# Patient Record
Sex: MALE | Race: WHITE | NOT HISPANIC OR LATINO | URBAN - METROPOLITAN AREA
[De-identification: names, ages, dates, MRNs, and addresses within clinical notes are randomized per-mention and may not be internally consistent; named-entity substitution may affect disease eponyms.]

---

## 2017-04-20 ENCOUNTER — EMERGENCY (EMERGENCY)
Facility: HOSPITAL | Age: 67
LOS: 0 days | Discharge: NURSING, REHAB OR LONG-TERM CARE | End: 2017-04-21
Admitting: INTERNAL MEDICINE

## 2017-06-27 DIAGNOSIS — R45.1 RESTLESSNESS AND AGITATION: ICD-10-CM

## 2017-06-27 DIAGNOSIS — J44.9 CHRONIC OBSTRUCTIVE PULMONARY DISEASE, UNSPECIFIED: ICD-10-CM

## 2017-06-27 DIAGNOSIS — F17.200 NICOTINE DEPENDENCE, UNSPECIFIED, UNCOMPLICATED: ICD-10-CM

## 2017-06-27 DIAGNOSIS — Z79.82 LONG TERM (CURRENT) USE OF ASPIRIN: ICD-10-CM

## 2017-06-27 DIAGNOSIS — I10 ESSENTIAL (PRIMARY) HYPERTENSION: ICD-10-CM

## 2017-06-27 DIAGNOSIS — Z79.899 OTHER LONG TERM (CURRENT) DRUG THERAPY: ICD-10-CM

## 2017-06-27 DIAGNOSIS — K21.9 GASTRO-ESOPHAGEAL REFLUX DISEASE WITHOUT ESOPHAGITIS: ICD-10-CM

## 2017-12-21 ENCOUNTER — EMERGENCY (EMERGENCY)
Facility: HOSPITAL | Age: 67
LOS: 0 days | Discharge: NURSING, REHAB OR LONG-TERM CARE | End: 2017-12-21
Admitting: INTERNAL MEDICINE

## 2017-12-21 DIAGNOSIS — J18.9 PNEUMONIA, UNSPECIFIED ORGANISM: ICD-10-CM

## 2017-12-21 DIAGNOSIS — R45.1 RESTLESSNESS AND AGITATION: ICD-10-CM

## 2017-12-21 DIAGNOSIS — F42.4 EXCORIATION (SKIN-PICKING) DISORDER: ICD-10-CM

## 2017-12-21 DIAGNOSIS — F25.9 SCHIZOAFFECTIVE DISORDER, UNSPECIFIED: ICD-10-CM

## 2017-12-21 DIAGNOSIS — I10 ESSENTIAL (PRIMARY) HYPERTENSION: ICD-10-CM

## 2017-12-21 DIAGNOSIS — J45.909 UNSPECIFIED ASTHMA, UNCOMPLICATED: ICD-10-CM

## 2017-12-21 DIAGNOSIS — R50.9 FEVER, UNSPECIFIED: ICD-10-CM

## 2017-12-21 DIAGNOSIS — J44.9 CHRONIC OBSTRUCTIVE PULMONARY DISEASE, UNSPECIFIED: ICD-10-CM

## 2017-12-21 DIAGNOSIS — K21.9 GASTRO-ESOPHAGEAL REFLUX DISEASE WITHOUT ESOPHAGITIS: ICD-10-CM

## 2017-12-21 DIAGNOSIS — Z79.82 LONG TERM (CURRENT) USE OF ASPIRIN: ICD-10-CM

## 2017-12-21 DIAGNOSIS — Z91.09 OTHER ALLERGY STATUS, OTHER THAN TO DRUGS AND BIOLOGICAL SUBSTANCES: ICD-10-CM

## 2017-12-21 DIAGNOSIS — E66.9 OBESITY, UNSPECIFIED: ICD-10-CM

## 2017-12-21 DIAGNOSIS — Z87.891 PERSONAL HISTORY OF NICOTINE DEPENDENCE: ICD-10-CM

## 2017-12-21 DIAGNOSIS — E11.9 TYPE 2 DIABETES MELLITUS WITHOUT COMPLICATIONS: ICD-10-CM

## 2017-12-21 DIAGNOSIS — M10.9 GOUT, UNSPECIFIED: ICD-10-CM

## 2018-11-28 ENCOUNTER — EMERGENCY (EMERGENCY)
Facility: HOSPITAL | Age: 68
LOS: 0 days | Discharge: NURSING, REHAB OR LONG-TERM CARE | End: 2018-11-28
Attending: EMERGENCY MEDICINE | Admitting: EMERGENCY MEDICINE

## 2018-11-28 VITALS
OXYGEN SATURATION: 96 % | TEMPERATURE: 98 F | DIASTOLIC BLOOD PRESSURE: 78 MMHG | HEART RATE: 92 BPM | RESPIRATION RATE: 18 BRPM | SYSTOLIC BLOOD PRESSURE: 162 MMHG | WEIGHT: 229.94 LBS

## 2018-11-28 DIAGNOSIS — L02.415 CUTANEOUS ABSCESS OF RIGHT LOWER LIMB: ICD-10-CM

## 2018-11-28 DIAGNOSIS — Z79.82 LONG TERM (CURRENT) USE OF ASPIRIN: ICD-10-CM

## 2018-11-28 DIAGNOSIS — Z88.8 ALLERGY STATUS TO OTHER DRUGS, MEDICAMENTS AND BIOLOGICAL SUBSTANCES: ICD-10-CM

## 2018-11-28 DIAGNOSIS — I10 ESSENTIAL (PRIMARY) HYPERTENSION: ICD-10-CM

## 2018-11-28 DIAGNOSIS — J44.9 CHRONIC OBSTRUCTIVE PULMONARY DISEASE, UNSPECIFIED: ICD-10-CM

## 2018-11-28 DIAGNOSIS — K21.9 GASTRO-ESOPHAGEAL REFLUX DISEASE WITHOUT ESOPHAGITIS: ICD-10-CM

## 2018-11-28 DIAGNOSIS — F20.9 SCHIZOPHRENIA, UNSPECIFIED: ICD-10-CM

## 2018-11-28 DIAGNOSIS — Z98.890 OTHER SPECIFIED POSTPROCEDURAL STATES: ICD-10-CM

## 2018-11-28 DIAGNOSIS — Z79.899 OTHER LONG TERM (CURRENT) DRUG THERAPY: ICD-10-CM

## 2018-11-28 RX ORDER — ZIPRASIDONE HYDROCHLORIDE 20 MG/1
0 CAPSULE ORAL
Qty: 0 | Refills: 0 | COMMUNITY

## 2018-11-28 RX ORDER — AZTREONAM 2 G
1 VIAL (EA) INJECTION
Qty: 14 | Refills: 0
Start: 2018-11-28 | End: 2018-12-04

## 2018-11-28 RX ORDER — ZIPRASIDONE HYDROCHLORIDE 20 MG/1
1 CAPSULE ORAL
Qty: 0 | Refills: 0 | COMMUNITY

## 2018-11-28 RX ORDER — RANITIDINE HYDROCHLORIDE 150 MG/1
1 TABLET, FILM COATED ORAL
Qty: 0 | Refills: 0 | COMMUNITY

## 2018-11-28 RX ORDER — CEPHALEXIN 500 MG
1 CAPSULE ORAL
Qty: 28 | Refills: 0
Start: 2018-11-28 | End: 2018-12-04

## 2018-11-28 RX ORDER — AMLODIPINE BESYLATE 2.5 MG/1
1 TABLET ORAL
Qty: 0 | Refills: 0 | COMMUNITY

## 2018-11-28 RX ORDER — LINACLOTIDE 145 UG/1
1 CAPSULE, GELATIN COATED ORAL
Qty: 0 | Refills: 0 | COMMUNITY

## 2018-11-28 NOTE — ED PROVIDER NOTE - NS ED ROS FT
MS:  No myalgia, muscle weakness, joint pain or back pain.  Neuro:  No headache or weakness.  No LOC.  Skin:  +abscess  Endocrine: No history of thyroid disease or diabetes.  Except as documented in the HPI,  all other systems are negative.

## 2018-11-28 NOTE — ED ADULT NURSE NOTE - WOUND TYPE
RASH/erythematous lesion with hyperkeratotic center mid upper leg posterior left and miles-tibial lower leg, no drainage, depth unknown

## 2018-11-28 NOTE — ED PROVIDER NOTE - PHYSICAL EXAMINATION
VITAL SIGNS: I have reviewed nursing notes and confirm.  CONSTITUTIONAL: Well-developed; well-nourished; in no acute distress.   SKIN: 4x3cm abscess o right posterior thigh, with mild surrounding erythema.  EYES:  conjunctiva and sclera clear.  ENT: No nasal discharge; airway clear  CARD: S1, S2 normal; no murmurs, gallops, or rubs. Regular rate and rhythm.   RESP: No wheezes, rales or rhonchi.  ABD: Normal bowel sounds; soft; non-distended; non-tender  EXT: Normal ROM.  No clubbing, cyanosis or edema.   NEURO: Alert, oriented, grossly unremarkable

## 2018-11-28 NOTE — ED ADULT NURSE NOTE - PMH
COPD (chronic obstructive pulmonary disease)    GERD (gastroesophageal reflux disease)    Hypertension    Neuropathy    Schizophrenia    Substance abuse

## 2018-11-28 NOTE — ED ADULT NURSE NOTE - NSIMPLEMENTINTERV_GEN_ALL_ED
Implemented All Universal Safety Interventions:  [place] to call system. Call bell, personal items and telephone within reach. Instruct patient to call for assistance. Room bathroom lighting operational. Non-slip footwear when patient is off stretcher. Physically safe environment: no spills, clutter or unnecessary equipment. Stretcher in lowest position, wheels locked, appropriate side rails in place.

## 2018-11-28 NOTE — ED PROVIDER NOTE - ATTENDING CONTRIBUTION TO CARE
I personally evaluated the patient. I reviewed the Resident’s or Physician Assistant’s note (as assigned above), and agree with the findings and plan except as documented in my note.  I was present for and supervised the key/critical aspects of the procedures performed during the care of the patient.  A 69 y/o m w/ pmhx of copd, htn, gerd, schizophrenia, presents with L dorsal thigh abscess that he reports occurred over the past few days, tried to pop it, was still present so came to ed. pt reports his roommate has similar abscesses, pt has been treated with abx in the past for this, does not recall which ones, and states is following up with a physician. No fever, chills, n/v, cp,  pleuritic cp, sob, palpitations, diaphoresis, cough, ha/lh/dizziness, numbness/tingling, neck pain/ stiffness, abd pain, diarrhea, constipation, melena/brbpr, urinary symptoms, trauma, weakness, decreased sensation, current drug use, etoh abuse, edema, calf pain/swelling/erythema, sick contacts, or recent travel.  On Exam: Vital Signs: I have reviewed the initial vital signs. Constitutional: Male pt sitting on stretcher speaking full sentences. Integumentary: ~ 4x4 cm L posterior thigh abscess, with central area of scab from pt trying to squeeze it, surrounding fluctuance, mild erythema, no streaking, no crepitus, mild pain to palpation, no spontaneous purulent discharge, no odor.  ENT: MMM NECK: Supple, non-tender, no meningeal signs. Cardiovascular: RRR, radial pulses 2/4 b/l. No JVD. Respiratory: BS present b/l, ctabl, no wheezing or crackles, good resp effort and excursion, good air exchange,  no accessory muscle use, no stridor. Gastrointestinal: BS present throughout all 4 quadrants, soft, nd, nt, no rebound tenderness or guarding, no cvat. Musculoskeletal: FROM of ext including LLE, no weakness, no edema, no calf pain/swelling/erythema. Neurologic: AAOx3, motor 5/5 and sensation intact throughout upper and lower ext, CN II-XII intact, No facial droop or slurring of speech. No focal deficits. Ambulatory in ED.

## 2018-11-28 NOTE — ED PROVIDER NOTE - MEDICAL DECISION MAKING DETAILS
I and D done in ED, pt aware of abx, compliance, and wound check  either back in ed or w/ PMD in 48-72 hrs, with proper wound care discussed, pt aware and agrees would like to go home, reports will follow up.

## 2018-11-28 NOTE — ED PROVIDER NOTE - OBJECTIVE STATEMENT
Pt is a 67y/o male with a pmhx of schizophrenia, alcohol abuse, HTN, GERD presents today for eval of abscess to right posterior thigh for approx 1 week for which he has been applying hydrocortisone cream to. Pt denies fever, chills, weakness,, n/v/d.

## 2018-11-28 NOTE — ED PROVIDER NOTE - CARE PLAN
Assessment and plan of treatment:	Plan: L femur xray, I and D, reassess. Principal Discharge DX:	Abscess  Assessment and plan of treatment:	Plan: L femur xray, I and D, reassess.

## 2019-09-25 ENCOUNTER — INPATIENT (INPATIENT)
Facility: HOSPITAL | Age: 69
LOS: 4 days | Discharge: NURSING, REHAB OR LONG-TERM CARE | End: 2019-09-30
Attending: INTERNAL MEDICINE | Admitting: INTERNAL MEDICINE
Payer: MEDICARE

## 2019-09-25 VITALS
SYSTOLIC BLOOD PRESSURE: 208 MMHG | TEMPERATURE: 96 F | OXYGEN SATURATION: 98 % | RESPIRATION RATE: 19 BRPM | HEART RATE: 87 BPM | DIASTOLIC BLOOD PRESSURE: 97 MMHG

## 2019-09-25 DIAGNOSIS — R41.0 DISORIENTATION, UNSPECIFIED: ICD-10-CM

## 2019-09-25 DIAGNOSIS — R74.8 ABNORMAL LEVELS OF OTHER SERUM ENZYMES: ICD-10-CM

## 2019-09-25 PROBLEM — J44.9 CHRONIC OBSTRUCTIVE PULMONARY DISEASE, UNSPECIFIED: Chronic | Status: ACTIVE | Noted: 2018-11-28

## 2019-09-25 PROBLEM — K21.9 GASTRO-ESOPHAGEAL REFLUX DISEASE WITHOUT ESOPHAGITIS: Chronic | Status: ACTIVE | Noted: 2018-11-28

## 2019-09-25 PROBLEM — G62.9 POLYNEUROPATHY, UNSPECIFIED: Chronic | Status: ACTIVE | Noted: 2018-11-28

## 2019-09-25 PROBLEM — I10 ESSENTIAL (PRIMARY) HYPERTENSION: Chronic | Status: ACTIVE | Noted: 2018-11-28

## 2019-09-25 PROBLEM — F19.10 OTHER PSYCHOACTIVE SUBSTANCE ABUSE, UNCOMPLICATED: Chronic | Status: ACTIVE | Noted: 2018-11-28

## 2019-09-25 PROBLEM — F20.9 SCHIZOPHRENIA, UNSPECIFIED: Chronic | Status: ACTIVE | Noted: 2018-11-28

## 2019-09-25 LAB
ALBUMIN SERPL ELPH-MCNC: 4.6 G/DL — SIGNIFICANT CHANGE UP (ref 3.5–5.2)
ALP SERPL-CCNC: 59 U/L — SIGNIFICANT CHANGE UP (ref 30–115)
ALT FLD-CCNC: 35 U/L — SIGNIFICANT CHANGE UP (ref 0–41)
ANION GAP SERPL CALC-SCNC: 12 MMOL/L — SIGNIFICANT CHANGE UP (ref 7–14)
APAP SERPL-MCNC: <5 UG/ML — LOW (ref 10–30)
APPEARANCE UR: CLEAR — SIGNIFICANT CHANGE UP
AST SERPL-CCNC: 28 U/L — SIGNIFICANT CHANGE UP (ref 0–41)
BASOPHILS # BLD AUTO: 0.03 K/UL — SIGNIFICANT CHANGE UP (ref 0–0.2)
BASOPHILS NFR BLD AUTO: 0.5 % — SIGNIFICANT CHANGE UP (ref 0–1)
BILIRUB SERPL-MCNC: 0.5 MG/DL — SIGNIFICANT CHANGE UP (ref 0.2–1.2)
BILIRUB UR-MCNC: NEGATIVE — SIGNIFICANT CHANGE UP
BUN SERPL-MCNC: 21 MG/DL — HIGH (ref 10–20)
CALCIUM SERPL-MCNC: 9.8 MG/DL — SIGNIFICANT CHANGE UP (ref 8.5–10.1)
CHLORIDE SERPL-SCNC: 99 MMOL/L — SIGNIFICANT CHANGE UP (ref 98–110)
CO2 SERPL-SCNC: 30 MMOL/L — SIGNIFICANT CHANGE UP (ref 17–32)
COLOR SPEC: YELLOW — SIGNIFICANT CHANGE UP
CREAT SERPL-MCNC: 1.1 MG/DL — SIGNIFICANT CHANGE UP (ref 0.7–1.5)
DIFF PNL FLD: NEGATIVE — SIGNIFICANT CHANGE UP
DRUG SCREEN 1, URINE RESULT: SIGNIFICANT CHANGE UP
EOSINOPHIL # BLD AUTO: 0.1 K/UL — SIGNIFICANT CHANGE UP (ref 0–0.7)
EOSINOPHIL NFR BLD AUTO: 1.6 % — SIGNIFICANT CHANGE UP (ref 0–8)
ETHANOL SERPL-MCNC: <10 MG/DL — SIGNIFICANT CHANGE UP
GLUCOSE SERPL-MCNC: 120 MG/DL — HIGH (ref 70–99)
GLUCOSE UR QL: NEGATIVE — SIGNIFICANT CHANGE UP
HCT VFR BLD CALC: 41.8 % — LOW (ref 42–52)
HGB BLD-MCNC: 13.2 G/DL — LOW (ref 14–18)
IMM GRANULOCYTES NFR BLD AUTO: 0.2 % — SIGNIFICANT CHANGE UP (ref 0.1–0.3)
KETONES UR-MCNC: NEGATIVE — SIGNIFICANT CHANGE UP
LEUKOCYTE ESTERASE UR-ACNC: NEGATIVE — SIGNIFICANT CHANGE UP
LYMPHOCYTES # BLD AUTO: 1.32 K/UL — SIGNIFICANT CHANGE UP (ref 1.2–3.4)
LYMPHOCYTES # BLD AUTO: 21.3 % — SIGNIFICANT CHANGE UP (ref 20.5–51.1)
MCHC RBC-ENTMCNC: 28.2 PG — SIGNIFICANT CHANGE UP (ref 27–31)
MCHC RBC-ENTMCNC: 31.6 G/DL — LOW (ref 32–37)
MCV RBC AUTO: 89.3 FL — SIGNIFICANT CHANGE UP (ref 80–94)
MONOCYTES # BLD AUTO: 0.36 K/UL — SIGNIFICANT CHANGE UP (ref 0.1–0.6)
MONOCYTES NFR BLD AUTO: 5.8 % — SIGNIFICANT CHANGE UP (ref 1.7–9.3)
NEUTROPHILS # BLD AUTO: 4.37 K/UL — SIGNIFICANT CHANGE UP (ref 1.4–6.5)
NEUTROPHILS NFR BLD AUTO: 70.6 % — SIGNIFICANT CHANGE UP (ref 42.2–75.2)
NITRITE UR-MCNC: NEGATIVE — SIGNIFICANT CHANGE UP
NRBC # BLD: 0 /100 WBCS — SIGNIFICANT CHANGE UP (ref 0–0)
PH UR: 7 — SIGNIFICANT CHANGE UP (ref 5–8)
PLATELET # BLD AUTO: 151 K/UL — SIGNIFICANT CHANGE UP (ref 130–400)
POTASSIUM SERPL-MCNC: 4.2 MMOL/L — SIGNIFICANT CHANGE UP (ref 3.5–5)
POTASSIUM SERPL-SCNC: 4.2 MMOL/L — SIGNIFICANT CHANGE UP (ref 3.5–5)
PROT SERPL-MCNC: 8.1 G/DL — HIGH (ref 6–8)
PROT UR-MCNC: NEGATIVE — SIGNIFICANT CHANGE UP
RBC # BLD: 4.68 M/UL — LOW (ref 4.7–6.1)
RBC # FLD: 16 % — HIGH (ref 11.5–14.5)
SALICYLATES SERPL-MCNC: <0.3 MG/DL — LOW (ref 4–30)
SODIUM SERPL-SCNC: 141 MMOL/L — SIGNIFICANT CHANGE UP (ref 135–146)
SP GR SPEC: 1.01 — SIGNIFICANT CHANGE UP (ref 1.01–1.03)
TROPONIN T SERPL-MCNC: 0.62 NG/ML — CRITICAL HIGH
TROPONIN T SERPL-MCNC: 0.62 NG/ML — CRITICAL HIGH
UROBILINOGEN FLD QL: 0.2 — SIGNIFICANT CHANGE UP (ref 0.2–0.2)
WBC # BLD: 6.19 K/UL — SIGNIFICANT CHANGE UP (ref 4.8–10.8)
WBC # FLD AUTO: 6.19 K/UL — SIGNIFICANT CHANGE UP (ref 4.8–10.8)

## 2019-09-25 PROCEDURE — 71046 X-RAY EXAM CHEST 2 VIEWS: CPT | Mod: 26

## 2019-09-25 PROCEDURE — 90792 PSYCH DIAG EVAL W/MED SRVCS: CPT

## 2019-09-25 PROCEDURE — 99285 EMERGENCY DEPT VISIT HI MDM: CPT

## 2019-09-25 PROCEDURE — 70450 CT HEAD/BRAIN W/O DYE: CPT | Mod: 26

## 2019-09-25 RX ORDER — SODIUM CHLORIDE 9 MG/ML
1000 INJECTION, SOLUTION INTRAVENOUS
Refills: 0 | Status: DISCONTINUED | OUTPATIENT
Start: 2019-09-25 | End: 2019-09-30

## 2019-09-25 RX ORDER — FLUPHENAZINE HYDROCHLORIDE 1 MG/1
0 TABLET, FILM COATED ORAL
Qty: 0 | Refills: 0 | DISCHARGE

## 2019-09-25 RX ORDER — ACETAMINOPHEN 500 MG
650 TABLET ORAL EVERY 6 HOURS
Refills: 0 | Status: DISCONTINUED | OUTPATIENT
Start: 2019-09-25 | End: 2019-09-30

## 2019-09-25 RX ORDER — HEPARIN SODIUM 5000 [USP'U]/ML
5000 INJECTION INTRAVENOUS; SUBCUTANEOUS EVERY 8 HOURS
Refills: 0 | Status: DISCONTINUED | OUTPATIENT
Start: 2019-09-25 | End: 2019-09-30

## 2019-09-25 RX ORDER — AMLODIPINE BESYLATE 2.5 MG/1
5 TABLET ORAL DAILY
Refills: 0 | Status: DISCONTINUED | OUTPATIENT
Start: 2019-09-25 | End: 2019-09-30

## 2019-09-25 RX ORDER — ASPIRIN/CALCIUM CARB/MAGNESIUM 324 MG
81 TABLET ORAL DAILY
Refills: 0 | Status: DISCONTINUED | OUTPATIENT
Start: 2019-09-25 | End: 2019-09-30

## 2019-09-25 RX ORDER — CLOPIDOGREL BISULFATE 75 MG/1
300 TABLET, FILM COATED ORAL ONCE
Refills: 0 | Status: COMPLETED | OUTPATIENT
Start: 2019-09-25 | End: 2019-09-26

## 2019-09-25 RX ORDER — DOCUSATE SODIUM 100 MG
1 CAPSULE ORAL
Qty: 0 | Refills: 0 | DISCHARGE

## 2019-09-25 RX ORDER — DIPHENHYDRAMINE HCL 50 MG
1 CAPSULE ORAL
Qty: 0 | Refills: 0 | DISCHARGE

## 2019-09-25 RX ORDER — METOPROLOL TARTRATE 50 MG
25 TABLET ORAL DAILY
Refills: 0 | Status: DISCONTINUED | OUTPATIENT
Start: 2019-09-25 | End: 2019-09-30

## 2019-09-25 RX ORDER — ACETAMINOPHEN 500 MG
2 TABLET ORAL
Qty: 0 | Refills: 0 | DISCHARGE

## 2019-09-25 RX ADMIN — Medication 2 MILLIGRAM(S): at 20:45

## 2019-09-25 NOTE — H&P ADULT - HISTORY OF PRESENT ILLNESS
Pt is a 69 yo m resident of [facility] with h/o schizophrenia HTN COPD sent from facility for psychosis. As per report pt was urinating in the hallway and had AMS. In ER pt without complaints and did not know why he was sent, he denies fever, chills, CP, SOB, N/V/D. He became agitated before assessment and was given Ativan full ROS limited at this time.   Pt is known by psych and is usually AAOx3 despite h/o schizophrenia , today was AAOx1. He underwent NCHCT, urine studies and blood work to r/o medical causes of AMS and was found to have elevated troponin 0.06 x2. Pt is a 69 yo m resident of [facility] with h/o schizophrenia HTN COPD sent from facility for psychosis. As per report pt was urinating in the hallway and had AMS. In ER pt without complaints and did not know why he was sent, he denies fever, chills, CP, SOB, N/V/D. He became agitated before assessment and was given Ativan full ROS limited at this time.   Pt is known by psych and is usually AAOx3 despite h/o schizophrenia , today was AAOx1. He underwent NCHCT, urine studies and blood work to r/o medical causes of AMS and was found to have elevated troponin 0.6 x2.

## 2019-09-25 NOTE — H&P ADULT - NSHPLABSRESULTS_GEN_ALL_CORE
13.2   6.19  )-----------( 151      ( 25 Sep 2019 17:50 )             41.8     Urinalysis Basic - ( 25 Sep 2019 17:50 )    Color: Yellow / Appearance: Clear / S.015 / pH: x  Gluc: x / Ketone: Negative  / Bili: Negative / Urobili: 0.2   Blood: x / Protein: Negative / Nitrite: Negative   Leuk Esterase: Negative / RBC: x / WBC x   Sq Epi: x / Non Sq Epi: x / Bacteria: x          141  |  99  |  21<H>  ----------------------------<  120<H>  4.2   |  30  |  1.1    Ca    9.8      25 Sep 2019 17:50    TPro  8.1<H>  /  Alb  4.6  /  TBili  0.5  /  DBili  x   /  AST  28  /  ALT  35  /  AlkPhos  59      CARDIAC MARKERS ( 25 Sep 2019 20:55 )  x     / 0.62 ng/mL / x     / x     / x      CARDIAC MARKERS ( 25 Sep 2019 17:50 )  x     / 0.62 ng/mL / x     / x     / x        < from: CT Head No Cont (19 @ 17:43) >      Findings:    The ventricles and cortical sulci are prominent consistent with   parenchymal volume loss.    There are scattered punctate hypodensities throughout the hemispheric   white matter without mass effect compatible with chronic microvascular   changes.      There is no acute intracranial hemorrhage, extra-axial fluid collection   or midline shift.  Gray white matter differentiation is maintained.    Stable small round hypodensity in the right posterior basal ganglia seen   on prior examination, likely representing old lacunar infarct versus   prominent [place] perivascular space.    There is calcific atherosclerotic disease at the skullbase.    The calvarium is intact.    The visualized paranasal sinuses and mastoids are well aerated.      IMPRESSION:     No evidence of acute intracranial pathology.    < end of copied text >

## 2019-09-25 NOTE — ED PROVIDER NOTE - OBJECTIVE STATEMENT
hx from   69 yo M hx of schizophrenia, , neuropathy hx from Dr. Lindsay from facility   69 yo M hx of schizophrenia, COPD, neuropathy here for altered mental status. Patient is acting differently today and was urinating in the stairwells.

## 2019-09-25 NOTE — ED BEHAVIORAL HEALTH NOTE - BEHAVIORAL HEALTH NOTE
CC: "I was sent here for evaluation, I do not know why". Reportedly, Pt urinated at residence in hallway 3-4 times today.    HPI: 69yo WM with h/o schizophrenia, [facility] resident, BIBA from [facility] due to acute change in mental status. Pt spent longer than 2 hours in ED prior to this eval, and has been quiet, not agitated, resting comfortably in bed with 1:1  nearby. Pt was seen, EMR reviewed, collaterals obtained from [facility]. Pt is hard to interview due to prominent dysarthria and disorganised t/p. He denies any psychiatric complaints, denies depression or SI/HI. He is delusional (which is his baseline), stating that babies are raped at his residence and he has connection with different government organizations. When asked why he urinated in hallway at residence, he said "there was shit and piss everywhere, so what could I do?". Of note, he reports today's date as 12/25/2027, not oriented to place, and states that his age is "above 71".   Collaterals were obtained from psychologist Dr. Lindsay at [facility] (phone [phone]). Pt is delusional and psychotic on baseline. However, he is usually oriented x3 and can maintain a meaningful conversation. He was functioning on his baseline till today, when he became disoriented and urinated several times in hallways. This is an acute mental status change. Pt has been adherent to his medications.    ED course: On 1:1, calm, cooperative, not agitated.    PMH: COPD, GERD, HTN, neuropathy. BP at /97.     PPH: schizophrenia, followed at residence.    Current meds: Prolixin 2.5mg/ml soln, prolixin PO 10mg q12h.    FH: unable to obtain    Drug Hx: ETOH use indicated in the EMR.    SH: [facility] resident    MSE: A,Ox1 approximately (to his name only), no PMA/R, speech slurred, hard to understand, mood "varies", affect odd, blunted, t/p tangential, t/c + delusions of "babies being raped at his residence" and connection to different agencies, likely AH, no VH, denies SI/HI, i/j poor.    A/P 69yo M with schizophrenia, acute mental status change over last 24 hours or less, no si/hi, baseline delusions. Pt needs to be assessed medically to r/o delirium due to [facility]. No indications for iPP admission.

## 2019-09-25 NOTE — ED PROVIDER NOTE - PHYSICAL EXAMINATION
Gen: Alert, NAD, well appearing  Head: NC, AT, PERRL, EOMI, normal lids/conjunctiva  ENT: normal hearing, patent oropharynx   Neck: +supple, no tenderness/meningismus,  Pulm: Bilateral BS, normal resp effort, no wheeze/stridor/retractions  CV: RRR, no murmer  Abd: soft, NT/ND  Mskel: no edema/erythema/cyanosis  Skin: no rash, warm/dry  Neuro: AAOx1, no focal deficits noted, rambling on and on

## 2019-09-25 NOTE — ED ADULT NURSE NOTE - CHIEF COMPLAINT QUOTE
biba via PCA from [facility], staff reported to ems dramatic mood swings, anger.

## 2019-09-25 NOTE — H&P ADULT - NSHPPHYSICALEXAM_GEN_ALL_CORE
ICU Vital Signs Last 24 Hrs  T(C): 36.3 (25 Sep 2019 21:27), Max: 36.3 (25 Sep 2019 21:27)  T(F): 97.3 (25 Sep 2019 21:27), Max: 97.3 (25 Sep 2019 21:27)  HR: 94 (25 Sep 2019 21:27) (85 - 94)  BP: 154/72 (25 Sep 2019 21:27) (154/72 - 208/97)  RR: 19 (25 Sep 2019 21:27) (19 - 19)  SpO2: 98% (25 Sep 2019 21:27) (98% - 98%)    Constitutional: NAD,  non toxic appearing   HEENT: Airway patent, moist MM, no erythema/swelling/deformity of oral structures. EOMI, PERRLA.  CV: regular rate, regular rhythm, well-perfused extremities, 2+ b/l DP and radial pulses equal.  Lungs: BCTA, no increased WOB.  ABD: NTND, no guarding or rebound, no pulsatile mass, no hernias.   MSK: Neck supple, nontender, nl ROM, no stepoff. Chest nontender. Back nontender in TLS spine or to b/l bony structures or flanks. Ext nontender, nl rom, no deformity.   INTEG: Skin warm, dry, no rash.  NEURO: A&Ox1   PSYCH: Denies SI/HI/hallucinations ICU Vital Signs Last 24 Hrs  T(C): 36.3 (25 Sep 2019 21:27), Max: 36.3 (25 Sep 2019 21:27)  T(F): 97.3 (25 Sep 2019 21:27), Max: 97.3 (25 Sep 2019 21:27)  HR: 94 (25 Sep 2019 21:27) (85 - 94)  BP: 154/72 (25 Sep 2019 21:27) (154/72 - 208/97)  RR: 19 (25 Sep 2019 21:27) (19 - 19)  SpO2: 98% (25 Sep 2019 21:27) (98% - 98%)    Constitutional: NAD,  non toxic appearing   HEENT: Airway patent, moist MM, no erythema/swelling/deformity of oral structures. EOMI, PERRLA.  CV: regular rate, regular rhythm, well-perfused extremities, 2+ b/l DP and radial pulses equal.  Lungs: BCTA, no increased WOB.  ABD: NTND, no guarding or rebound, no pulsatile mass, no hernias.   MSK: Neck supple, nontender, nl ROM, no stepoff. Chest nontender. Back nontender in TLS spine or to b/l bony structures or flanks. Ext nontender, nl rom, no deformity.   INTEG: Skin warm, dry, no rash.  NEURO: A&Ox0 s/p ativan   PSYCH: Denies SI/HI/hallucinations

## 2019-09-25 NOTE — ED ADULT NURSE REASSESSMENT NOTE - NS ED NURSE REASSESS COMMENT FT1
Attempted to redraw patients tropinin levels and found the patient to be agigtated. Patient refused to have blood drawn. BRIAN Aranda made aware.

## 2019-09-25 NOTE — ED PROVIDER NOTE - ATTENDING CONTRIBUTION TO CARE
69 yo M PMHx noted including schizophrenia, COPD, HTN, presents from residence for evaluation of change in behavior. Pt agitated, was urinating in halls at residence.  In ED pt is alert and awake, Oriented to person, seen eating a meal tray. calm until approached, then is loud. no sign sof trauma, PERRL, Lungs CTA B/L no wrr, abd is soft nt nd, no edema, moving all ext

## 2019-09-25 NOTE — CONSULT NOTE ADULT - SUBJECTIVE AND OBJECTIVE BOX
Patient is a 68y old  Male who presents with a chief complaint of sent from [facility] for AMS (25 Sep 2019 22:34)      HPI:  Pt is a 67 yo m resident of [facility] with h/o schizophrenia HTN COPD sent from facility for psychosis. As per report pt was urinating in the hallway and had AMS. In ER pt without complaints and did not know why he was sent, he denies fever, chills, CP, SOB, N/V/D. He became agitated before assessment and was given Ativan full ROS limited at this time.   Pt is known by psych and is usually AAOx3 despite h/o schizophrenia , today was AAOx1. He underwent NCHCT, urine studies and blood work to r/o medical causes of AMS and was found to have elevated troponin 0.06 x2. (25 Sep 2019 22:34)  Pt ER labs show elevated troponins. x 2     PAST MEDICAL & SURGICAL HISTORY:  Hypertension  Neuropathy  Substance abuse  Schizophrenia  COPD (chronic obstructive pulmonary disease)  GERD (gastroesophageal reflux disease)  No significant past surgical history    Allergies    Talwin (Unknown)    Intolerances      FAMILY HISTORY:  No pertinent family history in first degree relatives    Home Medications:  Artificial Tears ophthalmic solution:  (2018 13:17)  Aspir 81 oral delayed release tablet: 1 tab(s) orally once a day (2018 13:17)  Benadryl 50 mg oral capsule: 1 cap(s) orally once a day (25 Sep 2019 22:27)  Claritin 10 mg oral tablet: 1 tab(s) orally once a day (2018 13:17)  Colace 100 mg oral capsule: 2 cap(s) orally once a day (at bedtime) (25 Sep 2019 22:28)  Geodon 80 mg oral capsule: 1  orally 2 times a day (2018 13:39)  Linzess 145 mcg oral capsule: 1 cap(s) orally once a day (2018 13:40)  Norvasc 5 mg oral tablet: 1 tab(s) orally once a day (2018 13:17)  Prolixin 10 mg oral tablet: 1 tab(s) orally every 12 hours (2018 13:17)  Prolixin Decanoate 25 mg/mL injectable solution: 25 milligram(s) injectable every 2 weeks (25 Sep 2019 22:34)  Tylenol 325 mg oral tablet: 2 tab(s) orally every 8 hours, As Needed (25 Sep 2019 22:36)  Vitamin D3 50,000 intl units oral capsule:  (2018 13:17)  Zantac 150 oral tablet: 1 tab(s) orally 2 times a day (2018 13:17)    Occupation:  Alochol: Denied  Smoking: Denied  Drug Use: Denied  Marital Status:         ROS: as in HPI; All other systems reviewed are negative    ICU Vital Signs Last 24 Hrs  T(C): 36.3 (25 Sep 2019 21:27), Max: 36.3 (25 Sep 2019 21:27)  T(F): 97.3 (25 Sep 2019 21:27), Max: 97.3 (25 Sep 2019 21:27)  HR: 94 (25 Sep 2019 21:27) (85 - 94)  BP: 154/72 (25 Sep 2019 21:27) (154/72 - 208/97)  BP(mean): --  ABP: --  ABP(mean): --  RR: 19 (25 Sep 2019 21:27) (19 - 19)  SpO2: 98% (25 Sep 2019 21:27) (98% - 98%)        Physical Examination:    General: middle aged white male  No acute respiratory distress.  pt's sedated in ER     HEENT: Pupils equal, reactive to light.  Symmetric.    PULM: Clear to auscultation bilaterally, no significant sputum production    CVS: Regular rate and rhythm, no murmurs, rubs, or gallops    ABD: Soft, nondistended, nontender, normoactive bowel sounds, no masses    EXT: No edema, nontender    SKIN: Warm and well perfused, no rashes noted.              I&O's Detail        LABS:                        13.2   6.19  )-----------( 151      ( 25 Sep 2019 17:50 )             41.8     25 Sep 2019 17:50    141    |  99     |  21     ----------------------------<  120    4.2     |  30     |  1.1      Ca    9.8        25 Sep 2019 17:50    TPro  8.1    /  Alb  4.6    /  TBili  0.5    /  DBili  x      /  AST  28     /  ALT  35     /  AlkPhos  59     25 Sep 2019 17:50  Amylase x     lipase x          CARDIAC MARKERS ( 25 Sep 2019 20:55 )  x     / 0.62 ng/mL / x     / x     / x      CARDIAC MARKERS ( 25 Sep 2019 17:50 )  x     / 0.62 ng/mL / x     / x     / x          CAPILLARY BLOOD GLUCOSE          Urinalysis Basic - ( 25 Sep 2019 17:50 )    Color: Yellow / Appearance: Clear / S.015 / pH: x  Gluc: x / Ketone: Negative  / Bili: Negative / Urobili: 0.2   Blood: x / Protein: Negative / Nitrite: Negative   Leuk Esterase: Negative / RBC: x / WBC x   Sq Epi: x / Non Sq Epi: x / Bacteria: x      Culture        MEDICATIONS  (STANDING):  amLODIPine   Tablet 5 milliGRAM(s) Oral daily  aspirin enteric coated 81 milliGRAM(s) Oral daily    MEDICATIONS  (PRN):  acetaminophen   Tablet .. 650 milliGRAM(s) Oral every 6 hours PRN Temp greater or equal to 38C (100.4F), Mild Pain (1 - 3)              IMPRESSION:  1. acute altered mental status. w/ elevated troponins r/o-NSTEMI      PLAN:  pt 's in no acute distress . discussed w/ on call ICU MD . No need for ICU @ this time  adm. to LR tele.    CNS:    HEENT:    PULMONARY:    CARDIOVASCULAR:  trend troponins start ASA, plavix, BB , cardio eval in am     GI: GI prophylaxis.  Feeding     RENAL:    INFECTIOUS DISEASE:    HEMATOLOGICAL:  DVT prophylaxis.    ENDOCRINE:  Follow up FS.  Insulin protocol if needed.    MUSCULOSKELETAL:        CRITICAL CARE TIME SPENT: *** Patient is a 68y old  Male who presents with a chief complaint of sent from [facility] for AMS (25 Sep 2019 22:34)      HPI:  Pt is a 69 yo m resident of [facility] with h/o schizophrenia HTN COPD sent from facility for psychosis. As per report pt was urinating in the hallway and had AMS. In ER pt without complaints and did not know why he was sent, he denies fever, chills, CP, SOB, N/V/D. He became agitated before assessment and was given Ativan full ROS limited at this time.   Pt is known by psych and is usually AAOx3 despite h/o schizophrenia , today was AAOx1. He underwent NCHCT, urine studies and blood work to r/o medical causes of AMS and was found to have elevated troponin 0.62  x2. (25 Sep 2019 22:34)  Pt ER labs show elevated troponins. x 2     PAST MEDICAL & SURGICAL HISTORY:  Hypertension  Neuropathy  Substance abuse  Schizophrenia  COPD (chronic obstructive pulmonary disease)  GERD (gastroesophageal reflux disease)  No significant past surgical history    Allergies    Talwin (Unknown)    Intolerances      FAMILY HISTORY:  No pertinent family history in first degree relatives    Home Medications:  Artificial Tears ophthalmic solution:  (2018 13:17)  Aspir 81 oral delayed release tablet: 1 tab(s) orally once a day (2018 13:17)  Benadryl 50 mg oral capsule: 1 cap(s) orally once a day (25 Sep 2019 22:27)  Claritin 10 mg oral tablet: 1 tab(s) orally once a day (2018 13:17)  Colace 100 mg oral capsule: 2 cap(s) orally once a day (at bedtime) (25 Sep 2019 22:28)  Geodon 80 mg oral capsule: 1  orally 2 times a day (2018 13:39)  Linzess 145 mcg oral capsule: 1 cap(s) orally once a day (2018 13:40)  Norvasc 5 mg oral tablet: 1 tab(s) orally once a day (2018 13:17)  Prolixin 10 mg oral tablet: 1 tab(s) orally every 12 hours (2018 13:17)  Prolixin Decanoate 25 mg/mL injectable solution: 25 milligram(s) injectable every 2 weeks (25 Sep 2019 22:34)  Tylenol 325 mg oral tablet: 2 tab(s) orally every 8 hours, As Needed (25 Sep 2019 22:36)  Vitamin D3 50,000 intl units oral capsule:  (2018 13:17)  Zantac 150 oral tablet: 1 tab(s) orally 2 times a day (2018 13:17)    Occupation:  Alochol: Denied  Smoking: Denied  Drug Use: Denied  Marital Status:         ROS: as in HPI; All other systems reviewed are negative    ICU Vital Signs Last 24 Hrs  T(C): 36.3 (25 Sep 2019 21:27), Max: 36.3 (25 Sep 2019 21:27)  T(F): 97.3 (25 Sep 2019 21:27), Max: 97.3 (25 Sep 2019 21:27)  HR: 94 (25 Sep 2019 21:27) (85 - 94)  BP: 154/72 (25 Sep 2019 21:27) (154/72 - 208/97)  BP(mean): --  ABP: --  ABP(mean): --  RR: 19 (25 Sep 2019 21:27) (19 - 19)  SpO2: 98% (25 Sep 2019 21:27) (98% - 98%)        Physical Examination:    General: middle aged white male  No acute respiratory distress.  pt's sedated in ER     HEENT: Pupils equal, reactive to light.  Symmetric.    PULM: Clear to auscultation bilaterally, no significant sputum production    CVS: Regular rate and rhythm, no murmurs, rubs, or gallops    ABD: Soft, nondistended, nontender, normoactive bowel sounds, no masses    EXT: No edema, nontender    SKIN: Warm and well perfused, no rashes noted.              I&O's Detail        LABS:                        13.2   6.19  )-----------( 151      ( 25 Sep 2019 17:50 )             41.8     25 Sep 2019 17:50    141    |  99     |  21     ----------------------------<  120    4.2     |  30     |  1.1      Ca    9.8        25 Sep 2019 17:50    TPro  8.1    /  Alb  4.6    /  TBili  0.5    /  DBili  x      /  AST  28     /  ALT  35     /  AlkPhos  59     25 Sep 2019 17:50  Amylase x     lipase x          CARDIAC MARKERS ( 25 Sep 2019 20:55 )  x     / 0.62 ng/mL / x     / x     / x      CARDIAC MARKERS ( 25 Sep 2019 17:50 )  x     / 0.62 ng/mL / x     / x     / x          CAPILLARY BLOOD GLUCOSE          Urinalysis Basic - ( 25 Sep 2019 17:50 )    Color: Yellow / Appearance: Clear / S.015 / pH: x  Gluc: x / Ketone: Negative  / Bili: Negative / Urobili: 0.2   Blood: x / Protein: Negative / Nitrite: Negative   Leuk Esterase: Negative / RBC: x / WBC x   Sq Epi: x / Non Sq Epi: x / Bacteria: x      Culture        MEDICATIONS  (STANDING):  amLODIPine   Tablet 5 milliGRAM(s) Oral daily  aspirin enteric coated 81 milliGRAM(s) Oral daily    MEDICATIONS  (PRN):  acetaminophen   Tablet .. 650 milliGRAM(s) Oral every 6 hours PRN Temp greater or equal to 38C (100.4F), Mild Pain (1 - 3)              IMPRESSION:  1. acute altered mental status. w/ elevated troponins r/o-NSTEMI      PLAN:  pt 's in no acute distress . discussed w/ on call ICU MD . No need for ICU @ this time  adm. to LR tele.    CNS:    HEENT:    PULMONARY:    CARDIOVASCULAR:  trend troponins start ASA, plavix, BB , cardio eval in am     GI: GI prophylaxis.  Feeding     RENAL:    INFECTIOUS DISEASE:    HEMATOLOGICAL:  DVT prophylaxis.    ENDOCRINE:  Follow up FS.  Insulin protocol if needed.    MUSCULOSKELETAL:        CRITICAL CARE TIME SPENT: *** Patient is a 68y old  Male who presents with a chief complaint of sent from [facility] for AMS (25 Sep 2019 22:34)      HPI:  Pt is a 69 yo m resident of [facility] with h/o schizophrenia HTN COPD sent from facility for psychosis. As per report pt was urinating in the hallway and had AMS. In ER pt without complaints and did not know why he was sent, he denies fever, chills, CP, SOB, N/V/D. He became agitated before assessment and was given Ativan full ROS limited at this time.   Pt is known by psych and is usually AAOx3 despite h/o schizophrenia , today was AAOx1. He underwent NCHCT, urine studies and blood work to r/o medical causes of AMS and was found to have elevated troponin 0.62  x2. (25 Sep 2019 22:34)  Pt ER labs show elevated troponins. x 2     PAST MEDICAL & SURGICAL HISTORY:  Hypertension  Neuropathy  Substance abuse  Schizophrenia  COPD (chronic obstructive pulmonary disease)  GERD (gastroesophageal reflux disease)  No significant past surgical history    Allergies    Talwin (Unknown)    Intolerances      FAMILY HISTORY:  No pertinent family history in first degree relatives    Home Medications:  Artificial Tears ophthalmic solution:  (2018 13:17)  Aspir 81 oral delayed release tablet: 1 tab(s) orally once a day (2018 13:17)  Benadryl 50 mg oral capsule: 1 cap(s) orally once a day (25 Sep 2019 22:27)  Claritin 10 mg oral tablet: 1 tab(s) orally once a day (2018 13:17)  Colace 100 mg oral capsule: 2 cap(s) orally once a day (at bedtime) (25 Sep 2019 22:28)  Geodon 80 mg oral capsule: 1  orally 2 times a day (2018 13:39)  Linzess 145 mcg oral capsule: 1 cap(s) orally once a day (2018 13:40)  Norvasc 5 mg oral tablet: 1 tab(s) orally once a day (2018 13:17)  Prolixin 10 mg oral tablet: 1 tab(s) orally every 12 hours (2018 13:17)  Prolixin Decanoate 25 mg/mL injectable solution: 25 milligram(s) injectable every 2 weeks (25 Sep 2019 22:34)  Tylenol 325 mg oral tablet: 2 tab(s) orally every 8 hours, As Needed (25 Sep 2019 22:36)  Vitamin D3 50,000 intl units oral capsule:  (2018 13:17)  Zantac 150 oral tablet: 1 tab(s) orally 2 times a day (2018 13:17)    Occupation:  Alochol: Denied  Smoking: Denied  Drug Use: Denied  Marital Status:         ROS: as in HPI; All other systems reviewed are negative    ICU Vital Signs Last 24 Hrs  T(C): 36.3 (25 Sep 2019 21:27), Max: 36.3 (25 Sep 2019 21:27)  T(F): 97.3 (25 Sep 2019 21:27), Max: 97.3 (25 Sep 2019 21:27)  HR: 94 (25 Sep 2019 21:27) (85 - 94)  BP: 154/72 (25 Sep 2019 21:27) (154/72 - 208/97)  BP(mean): --  ABP: --  ABP(mean): --  RR: 19 (25 Sep 2019 21:27) (19 - 19)  SpO2: 98% (25 Sep 2019 21:27) (98% - 98%)        Physical Examination:    General: middle aged white male  No acute respiratory distress.      HEENT: Pupils equal, reactive to light.  Symmetric.    PULM: Clear to auscultation bilaterally, no significant sputum production    CVS: Regular rate and rhythm, no murmurs, rubs, or gallops    ABD: Soft, nondistended, nontender, normoactive bowel sounds, no masses    EXT: No edema, nontender    SKIN: Warm and well perfused, no rashes noted.              I&O's Detail        LABS:                        13.2   6.19  )-----------( 151      ( 25 Sep 2019 17:50 )             41.8     25 Sep 2019 17:50    141    |  99     |  21     ----------------------------<  120    4.2     |  30     |  1.1      Ca    9.8        25 Sep 2019 17:50    TPro  8.1    /  Alb  4.6    /  TBili  0.5    /  DBili  x      /  AST  28     /  ALT  35     /  AlkPhos  59     25 Sep 2019 17:50  Amylase x     lipase x          CARDIAC MARKERS ( 25 Sep 2019 20:55 )  x     / 0.62 ng/mL / x     / x     / x      CARDIAC MARKERS ( 25 Sep 2019 17:50 )  x     / 0.62 ng/mL / x     / x     / x          CAPILLARY BLOOD GLUCOSE          Urinalysis Basic - ( 25 Sep 2019 17:50 )    Color: Yellow / Appearance: Clear / S.015 / pH: x  Gluc: x / Ketone: Negative  / Bili: Negative / Urobili: 0.2   Blood: x / Protein: Negative / Nitrite: Negative   Leuk Esterase: Negative / RBC: x / WBC x   Sq Epi: x / Non Sq Epi: x / Bacteria: x      Culture        MEDICATIONS  (STANDING):  amLODIPine   Tablet 5 milliGRAM(s) Oral daily  aspirin enteric coated 81 milliGRAM(s) Oral daily    MEDICATIONS  (PRN):  acetaminophen   Tablet .. 650 milliGRAM(s) Oral every 6 hours PRN Temp greater or equal to 38C (100.4F), Mild Pain (1 - 3)

## 2019-09-25 NOTE — ED PROVIDER NOTE - CLINICAL SUMMARY MEDICAL DECISION MAKING FREE TEXT BOX
Pt seen by Dr Osuna, rec medical work up. Medical work up reveals elevated troponin.  Pt with increased agitation in ED.,  Required Ativan for his and staff safety. 2nd troponin unchanged.  Case d/w Intensivist, will admit to chest pain tele

## 2019-09-25 NOTE — H&P ADULT - ASSESSMENT
67 yo m resident of [facility] with h/o schizophrenia HTN COPD p/w elevated troponin without complaints of chest pain     # r/o ACS  admit to low risk tele  trend cardiac enzymes   serial EKG   echo   check electrolytes     # pmhx schizophrenia  appreciate psych recs   will need f/u for collateral pmhx     dvt ppx   gi ppx

## 2019-09-25 NOTE — H&P ADULT - ATTENDING COMMENTS
pt seen and examined independently,i have read and agree with above exam and poa,    lethargic,    unable to give history    received sedation as per psych    supp care      delerium  ariana jauregui  ce x3  cardio eval  2decho  psych meds  d/w pa

## 2019-09-26 DIAGNOSIS — F20.9 SCHIZOPHRENIA, UNSPECIFIED: ICD-10-CM

## 2019-09-26 LAB
ALBUMIN SERPL ELPH-MCNC: 4.2 G/DL — SIGNIFICANT CHANGE UP (ref 3.5–5.2)
ALP SERPL-CCNC: 52 U/L — SIGNIFICANT CHANGE UP (ref 30–115)
ALT FLD-CCNC: 32 U/L — SIGNIFICANT CHANGE UP (ref 0–41)
AMPHET UR-MCNC: NEGATIVE — SIGNIFICANT CHANGE UP
ANION GAP SERPL CALC-SCNC: 7 MMOL/L — SIGNIFICANT CHANGE UP (ref 7–14)
AST SERPL-CCNC: 24 U/L — SIGNIFICANT CHANGE UP (ref 0–41)
BARBITURATES UR SCN-MCNC: NEGATIVE — SIGNIFICANT CHANGE UP
BASOPHILS # BLD AUTO: 0.03 K/UL — SIGNIFICANT CHANGE UP (ref 0–0.2)
BASOPHILS NFR BLD AUTO: 0.6 % — SIGNIFICANT CHANGE UP (ref 0–1)
BENZODIAZ UR-MCNC: NEGATIVE — SIGNIFICANT CHANGE UP
BILIRUB SERPL-MCNC: 0.4 MG/DL — SIGNIFICANT CHANGE UP (ref 0.2–1.2)
BUN SERPL-MCNC: 22 MG/DL — HIGH (ref 10–20)
CALCIUM SERPL-MCNC: 9.8 MG/DL — SIGNIFICANT CHANGE UP (ref 8.5–10.1)
CHLORIDE SERPL-SCNC: 103 MMOL/L — SIGNIFICANT CHANGE UP (ref 98–110)
CHOLEST SERPL-MCNC: 131 MG/DL — SIGNIFICANT CHANGE UP (ref 100–200)
CK MB CFR SERPL CALC: 5.3 NG/ML — SIGNIFICANT CHANGE UP (ref 0.6–6.3)
CK MB CFR SERPL CALC: 5.7 NG/ML — SIGNIFICANT CHANGE UP (ref 0.6–6.3)
CK SERPL-CCNC: 290 U/L — HIGH (ref 0–225)
CO2 SERPL-SCNC: 34 MMOL/L — HIGH (ref 17–32)
COCAINE METAB.OTHER UR-MCNC: NEGATIVE — SIGNIFICANT CHANGE UP
CREAT SERPL-MCNC: 1 MG/DL — SIGNIFICANT CHANGE UP (ref 0.7–1.5)
EOSINOPHIL # BLD AUTO: 0.1 K/UL — SIGNIFICANT CHANGE UP (ref 0–0.7)
EOSINOPHIL NFR BLD AUTO: 2.2 % — SIGNIFICANT CHANGE UP (ref 0–8)
GLUCOSE SERPL-MCNC: 108 MG/DL — HIGH (ref 70–99)
HCT VFR BLD CALC: 39.4 % — LOW (ref 42–52)
HCV AB S/CO SERPL IA: 0.2 S/CO — SIGNIFICANT CHANGE UP (ref 0–0.99)
HCV AB SERPL-IMP: SIGNIFICANT CHANGE UP
HDLC SERPL-MCNC: 28 MG/DL — LOW
HGB BLD-MCNC: 12.2 G/DL — LOW (ref 14–18)
IMM GRANULOCYTES NFR BLD AUTO: 0.2 % — SIGNIFICANT CHANGE UP (ref 0.1–0.3)
LIPID PNL WITH DIRECT LDL SERPL: 102 MG/DL — SIGNIFICANT CHANGE UP (ref 4–129)
LYMPHOCYTES # BLD AUTO: 1.32 K/UL — SIGNIFICANT CHANGE UP (ref 1.2–3.4)
LYMPHOCYTES # BLD AUTO: 28.6 % — SIGNIFICANT CHANGE UP (ref 20.5–51.1)
MAGNESIUM SERPL-MCNC: 2.1 MG/DL — SIGNIFICANT CHANGE UP (ref 1.8–2.4)
MCHC RBC-ENTMCNC: 27.7 PG — SIGNIFICANT CHANGE UP (ref 27–31)
MCHC RBC-ENTMCNC: 31 G/DL — LOW (ref 32–37)
MCV RBC AUTO: 89.5 FL — SIGNIFICANT CHANGE UP (ref 80–94)
METHADONE UR-MCNC: NEGATIVE — SIGNIFICANT CHANGE UP
MONOCYTES # BLD AUTO: 0.42 K/UL — SIGNIFICANT CHANGE UP (ref 0.1–0.6)
MONOCYTES NFR BLD AUTO: 9.1 % — SIGNIFICANT CHANGE UP (ref 1.7–9.3)
NEUTROPHILS # BLD AUTO: 2.74 K/UL — SIGNIFICANT CHANGE UP (ref 1.4–6.5)
NEUTROPHILS NFR BLD AUTO: 59.3 % — SIGNIFICANT CHANGE UP (ref 42.2–75.2)
NRBC # BLD: 0 /100 WBCS — SIGNIFICANT CHANGE UP (ref 0–0)
OPIATES UR-MCNC: NEGATIVE — SIGNIFICANT CHANGE UP
PCP UR-MCNC: NEGATIVE — SIGNIFICANT CHANGE UP
PHOSPHATE SERPL-MCNC: 4.2 MG/DL — SIGNIFICANT CHANGE UP (ref 2.1–4.9)
PLATELET # BLD AUTO: 144 K/UL — SIGNIFICANT CHANGE UP (ref 130–400)
POTASSIUM SERPL-MCNC: 4.8 MMOL/L — SIGNIFICANT CHANGE UP (ref 3.5–5)
POTASSIUM SERPL-SCNC: 4.8 MMOL/L — SIGNIFICANT CHANGE UP (ref 3.5–5)
PROPOXYPHENE QUALITATIVE URINE RESULT: NEGATIVE — SIGNIFICANT CHANGE UP
PROT SERPL-MCNC: 7.4 G/DL — SIGNIFICANT CHANGE UP (ref 6–8)
RBC # BLD: 4.4 M/UL — LOW (ref 4.7–6.1)
RBC # FLD: 15.9 % — HIGH (ref 11.5–14.5)
SODIUM SERPL-SCNC: 144 MMOL/L — SIGNIFICANT CHANGE UP (ref 135–146)
THC UR QL: NEGATIVE — SIGNIFICANT CHANGE UP
TOTAL CHOLESTEROL/HDL RATIO MEASUREMENT: 4.7 RATIO — SIGNIFICANT CHANGE UP (ref 4–5.5)
TRIGL SERPL-MCNC: 89 MG/DL — SIGNIFICANT CHANGE UP (ref 10–149)
TROPONIN T SERPL-MCNC: 0.49 NG/ML — CRITICAL HIGH
TROPONIN T SERPL-MCNC: 0.54 NG/ML — CRITICAL HIGH
WBC # BLD: 4.62 K/UL — LOW (ref 4.8–10.8)
WBC # FLD AUTO: 4.62 K/UL — LOW (ref 4.8–10.8)

## 2019-09-26 PROCEDURE — 99221 1ST HOSP IP/OBS SF/LOW 40: CPT

## 2019-09-26 PROCEDURE — 99222 1ST HOSP IP/OBS MODERATE 55: CPT

## 2019-09-26 RX ORDER — HALOPERIDOL DECANOATE 100 MG/ML
2.5 INJECTION INTRAMUSCULAR ONCE
Refills: 0 | Status: COMPLETED | OUTPATIENT
Start: 2019-09-26 | End: 2019-09-26

## 2019-09-26 RX ORDER — ZIPRASIDONE HYDROCHLORIDE 20 MG/1
80 CAPSULE ORAL
Refills: 0 | Status: DISCONTINUED | OUTPATIENT
Start: 2019-09-26 | End: 2019-09-30

## 2019-09-26 RX ORDER — LABETALOL HCL 100 MG
10 TABLET ORAL ONCE
Refills: 0 | Status: COMPLETED | OUTPATIENT
Start: 2019-09-26 | End: 2019-09-26

## 2019-09-26 RX ORDER — HALOPERIDOL DECANOATE 100 MG/ML
2.5 INJECTION INTRAMUSCULAR ONCE
Refills: 0 | Status: DISCONTINUED | OUTPATIENT
Start: 2019-09-26 | End: 2019-09-26

## 2019-09-26 RX ORDER — FLUPHENAZINE HYDROCHLORIDE 1 MG/1
10 TABLET, FILM COATED ORAL EVERY 12 HOURS
Refills: 0 | Status: DISCONTINUED | OUTPATIENT
Start: 2019-09-26 | End: 2019-09-30

## 2019-09-26 RX ORDER — HALOPERIDOL DECANOATE 100 MG/ML
2 INJECTION INTRAMUSCULAR ONCE
Refills: 0 | Status: DISCONTINUED | OUTPATIENT
Start: 2019-09-26 | End: 2019-09-26

## 2019-09-26 RX ORDER — DIPHENHYDRAMINE HCL 50 MG
50 CAPSULE ORAL ONCE
Refills: 0 | Status: COMPLETED | OUTPATIENT
Start: 2019-09-26 | End: 2019-09-26

## 2019-09-26 RX ADMIN — HALOPERIDOL DECANOATE 2.5 MILLIGRAM(S): 100 INJECTION INTRAMUSCULAR at 10:52

## 2019-09-26 RX ADMIN — HEPARIN SODIUM 5000 UNIT(S): 5000 INJECTION INTRAVENOUS; SUBCUTANEOUS at 06:04

## 2019-09-26 RX ADMIN — Medication 2 MILLIGRAM(S): at 07:53

## 2019-09-26 RX ADMIN — Medication 50 MILLIGRAM(S): at 10:53

## 2019-09-26 RX ADMIN — Medication 2 MILLIGRAM(S): at 10:53

## 2019-09-26 RX ADMIN — FLUPHENAZINE HYDROCHLORIDE 10 MILLIGRAM(S): 1 TABLET, FILM COATED ORAL at 19:15

## 2019-09-26 RX ADMIN — Medication 25 MILLIGRAM(S): at 06:04

## 2019-09-26 RX ADMIN — Medication 10 MILLIGRAM(S): at 01:21

## 2019-09-26 RX ADMIN — AMLODIPINE BESYLATE 5 MILLIGRAM(S): 2.5 TABLET ORAL at 06:04

## 2019-09-26 RX ADMIN — ZIPRASIDONE HYDROCHLORIDE 80 MILLIGRAM(S): 20 CAPSULE ORAL at 19:15

## 2019-09-26 RX ADMIN — CLOPIDOGREL BISULFATE 300 MILLIGRAM(S): 75 TABLET, FILM COATED ORAL at 06:04

## 2019-09-26 NOTE — CHART NOTE - NSCHARTNOTEFT_GEN_A_CORE
Pt with code grey and case discessed with Psych Dr Covington. Pt given medication IVP push of benadryl haldol and ativan. pt tolerated without any incidence

## 2019-09-26 NOTE — CONSULT NOTE ADULT - SUBJECTIVE AND OBJECTIVE BOX
CARDIOLOGY CONSULT NOTE     CHIEF COMPLAINT/REASON FOR CONSULT:    HPI:  Pt is [facility] 67 [facility]o [facility] HTN COPD sent from facility for psychosis. As per report pt was urinating in the hallway and had AMS. In ER pt without complaints and did not know why he was sent, he denies fever, chills, CP, SOB, N/V/D. He became agitated before assessment and was given Ativan full ROS limited at this time.   Pt is known by psych and is usually AAOx3 despite h/o schizophrenia , today was AAOx1. He underwent NCHCT, urine studies and blood work to r/o medical causes of AMS and was found to have elevated troponin 0.6 x2. (25 Sep 2019 22:34)      PAST MEDICAL & SURGICAL HISTORY:  Hypertension  Neuropathy  Substance abuse  Schizophrenia  COPD (chronic obstructive pulmonary disease)  GERD (gastroesophageal reflux disease)  No significant past surgical history      Cardiac Risks:   [ x]HTN, [ ] DM, [ ] Smoking, [ ] FH,  [ ] Lipids        MEDICATIONS:  MEDICATIONS  (STANDING):  amLODIPine   Tablet 5 milliGRAM(s) Oral daily  aspirin enteric coated 81 milliGRAM(s) Oral daily  dextrose 5% + sodium chloride 0.9%. 1000 milliLiter(s) (75 mL/Hr) IV Continuous <Continuous>  heparin  Injectable 5000 Unit(s) SubCutaneous every 8 hours  metoprolol succinate ER 25 milliGRAM(s) Oral daily      FAMILY HISTORY:  No pertinent family history in first degree relatives      SOCIAL HISTORY:      [ ] Marital status   Allergies    Talwin (Unknown)        	    REVIEW OF SYSTEMS:  CONSTITUTIONAL: No fever, weight loss, or fatigue  EYES: No eye pain, visual disturbances, or discharge  ENMT:  No difficulty hearing, tinnitus, vertigo; No sinus or throat pain  NECK: No pain or stiffness  RESPIRATORY: No cough, wheezing, chills or hemoptysis; No Shortness of Breath  CARDIOVASCULAR: No chest pain, palpitations, passing out, dizziness, or leg swelling  GASTROINTESTINAL: No abdominal or epigastric pain. No nausea, vomiting, or hematemesis; No diarrhea or constipation. No melena or hematochezia.  GENITOURINARY: No dysuria, frequency, hematuria, or incontinence  NEUROLOGICAL: No headaches, memory loss, loss of strength, numbness, or tremors  SKIN: No itching, burning, rashes, or lesions   	    PHYSICAL EXAM:  T(C): 36.2 (09-26-19 @ 05:10), Max: 36.9 (09-25-19 @ 23:45)  HR: 81 (09-26-19 @ 05:10) (78 - 94)  BP: 160/78 (09-26-19 @ 05:10) (154/72 - 208/97)  RR: 16 (09-26-19 @ 05:10) (16 - 19)  SpO2: 96% (09-25-19 @ 23:50) (96% - 98%)  Wt(kg): --  I&O's Summary      Appearance: Normal	  Psychiatry: A & O x 3, Mood & affect appropriate  HEENT:   Normal oral mucosa, PERRL, EOMI	  Lymphatic: No lymphadenopathy  Cardiovascular: Normal S1 S2,RRR, No JVD, No murmurs  Respiratory: Lungs clear to auscultation	  Gastrointestinal:  Soft, Non-tender, + BS	  Skin: No rashes, No ecchymoses, No cyanosis	  Neurologic: Non-focal  Extremities: Normal range of motion, No clubbing, cyanosis or edema  Vascular: Peripheral pulses palpable 2+ bilaterally      ECG:  	not avaiable    	  LABS:	 	    CARDIAC MARKERS:                                    12.2   4.62  )-----------( 144      ( 26 Sep 2019 06:18 )             39.4     09-26    144  |  103  |  22<H>  ----------------------------<  108<H>  4.8   |  34<H>  |  1.0    Ca    9.8      26 Sep 2019 06:18  Phos  4.2     09-26  Mg     2.1     09-26    TPro  7.4  /  Alb  4.2  /  TBili  0.4  /  DBili  x   /  AST  24  /  ALT  32  /  AlkPhos  52  09-26

## 2019-09-26 NOTE — CHART NOTE - NSCHARTNOTEFT_GEN_A_CORE
Patient seen at bedside, resting comfortably.    Spoke w/Cardiology and Medicine Attending, will continue to monitor.  CE pending from 1500.  Cardiology to evaluate again in AM

## 2019-09-26 NOTE — PROGRESS NOTE BEHAVIORAL HEALTH - RISK ASSESSMENT
Pt does not currently have capacity to make medical decisions on his own behalf due to compromised mental status including decision to leave AMA.

## 2019-09-26 NOTE — PROGRESS NOTE BEHAVIORAL HEALTH - SUMMARY
69 yo WM w schizophrenia presents with change in mental status. Pt found w elevated troponins x 3. Requires further observation. Pt not oriented, not able to verbalize reasons for hospitalization. Pt does not have capacity to independently make medical decisions concerning his care.

## 2019-09-26 NOTE — PATIENT PROFILE ADULT - HOW PATIENT ADDRESSED, PROFILE
Júnior
I personally performed the service described in the documentation recorded by the scribe in my presence, and it accurately and completely records my words and actions.

## 2019-09-26 NOTE — CHART NOTE - NSCHARTNOTEFT_GEN_A_CORE
Patient became agitated, code grey called, asking to sign AMA.  Patient confused, A/O x 1.  Aggressive and combative.  Ativan 2mg IVP administered by me and stat Psych c/s ordered

## 2019-09-26 NOTE — PROGRESS NOTE BEHAVIORAL HEALTH - NSBHFUPINTERVALHXFT_PSY_A_CORE
67 yo WM, resident of [facility], w schizophrenia, known to be delusional at baseline but is usually alert and fully oriented and able to engage ini meaningful conversation per residence presents on 9/25/19 with confusion and disorientation and uncharacteristic behavior. Pt found w elevated troponins, being monitored. Today, pt verbalized desire to be discharged.   Pt encountered in bed laying peacefully w uneaten breakfast at side. Stated that he wants to leave hospital today. When asked orientation, questions, pt is not oriented to date or reason for hospitalization which is departure from baseline.

## 2019-09-26 NOTE — CONSULT NOTE ADULT - ASSESSMENT
Patient with history above. Claims he had one minute of chest pain. Troponin increased. No clear mi. Would echo repeat troponin cpk mb. Asa beta statin. Check CXR. W/U change MS. Prognosis guarded
IMPRESSION:  1. Psychosis   2- elevated troponins r/o-NSTEMI      PLAN:  pt 's in no acute distress . adm. to LR tele.    CNS: psych consult / neurology 1; 1 sit     HEENT: oral care     PULMONARY: keep pox > 92 %     CARDIOVASCULAR:  trend troponins start ASA, plavix, BB , cardio eval in am   echo     GI: GI prophylaxis.  Feeding     RENAL: follow lytes     INFECTIOUS DISEASE: no abx     HEMATOLOGICAL:  DVT prophylaxis.    ENDOCRINE:  Follow up FS.  Insulin protocol if needed.    MUSCULOSKELETAL:

## 2019-09-27 LAB
ALBUMIN SERPL ELPH-MCNC: 4.5 G/DL — SIGNIFICANT CHANGE UP (ref 3.5–5.2)
ALP SERPL-CCNC: 54 U/L — SIGNIFICANT CHANGE UP (ref 30–115)
ALT FLD-CCNC: 32 U/L — SIGNIFICANT CHANGE UP (ref 0–41)
ANION GAP SERPL CALC-SCNC: 8 MMOL/L — SIGNIFICANT CHANGE UP (ref 7–14)
AST SERPL-CCNC: 25 U/L — SIGNIFICANT CHANGE UP (ref 0–41)
BILIRUB SERPL-MCNC: 0.6 MG/DL — SIGNIFICANT CHANGE UP (ref 0.2–1.2)
BUN SERPL-MCNC: 22 MG/DL — HIGH (ref 10–20)
CALCIUM SERPL-MCNC: 9.9 MG/DL — SIGNIFICANT CHANGE UP (ref 8.5–10.1)
CHLORIDE SERPL-SCNC: 101 MMOL/L — SIGNIFICANT CHANGE UP (ref 98–110)
CK MB CFR SERPL CALC: 6.4 NG/ML — HIGH (ref 0.6–6.3)
CK SERPL-CCNC: 310 U/L — HIGH (ref 0–225)
CO2 SERPL-SCNC: 34 MMOL/L — HIGH (ref 17–32)
CREAT SERPL-MCNC: 1.1 MG/DL — SIGNIFICANT CHANGE UP (ref 0.7–1.5)
CULTURE RESULTS: NO GROWTH — SIGNIFICANT CHANGE UP
GLUCOSE SERPL-MCNC: 112 MG/DL — HIGH (ref 70–99)
HCT VFR BLD CALC: 42.6 % — SIGNIFICANT CHANGE UP (ref 42–52)
HGB BLD-MCNC: 13.3 G/DL — LOW (ref 14–18)
MCHC RBC-ENTMCNC: 28.1 PG — SIGNIFICANT CHANGE UP (ref 27–31)
MCHC RBC-ENTMCNC: 31.2 G/DL — LOW (ref 32–37)
MCV RBC AUTO: 90.1 FL — SIGNIFICANT CHANGE UP (ref 80–94)
NRBC # BLD: 0 /100 WBCS — SIGNIFICANT CHANGE UP (ref 0–0)
PLATELET # BLD AUTO: 137 K/UL — SIGNIFICANT CHANGE UP (ref 130–400)
POTASSIUM SERPL-MCNC: 4.3 MMOL/L — SIGNIFICANT CHANGE UP (ref 3.5–5)
POTASSIUM SERPL-SCNC: 4.3 MMOL/L — SIGNIFICANT CHANGE UP (ref 3.5–5)
PROT SERPL-MCNC: 7.8 G/DL — SIGNIFICANT CHANGE UP (ref 6–8)
RBC # BLD: 4.73 M/UL — SIGNIFICANT CHANGE UP (ref 4.7–6.1)
RBC # FLD: 15.8 % — HIGH (ref 11.5–14.5)
SODIUM SERPL-SCNC: 143 MMOL/L — SIGNIFICANT CHANGE UP (ref 135–146)
SPECIMEN SOURCE: SIGNIFICANT CHANGE UP
TROPONIN T SERPL-MCNC: 0.41 NG/ML — CRITICAL HIGH
WBC # BLD: 4.73 K/UL — LOW (ref 4.8–10.8)
WBC # FLD AUTO: 4.73 K/UL — LOW (ref 4.8–10.8)

## 2019-09-27 RX ADMIN — Medication 25 MILLIGRAM(S): at 05:24

## 2019-09-27 RX ADMIN — Medication 81 MILLIGRAM(S): at 15:01

## 2019-09-27 RX ADMIN — HEPARIN SODIUM 5000 UNIT(S): 5000 INJECTION INTRAVENOUS; SUBCUTANEOUS at 05:25

## 2019-09-27 RX ADMIN — ZIPRASIDONE HYDROCHLORIDE 80 MILLIGRAM(S): 20 CAPSULE ORAL at 05:24

## 2019-09-27 RX ADMIN — FLUPHENAZINE HYDROCHLORIDE 10 MILLIGRAM(S): 1 TABLET, FILM COATED ORAL at 05:24

## 2019-09-27 RX ADMIN — FLUPHENAZINE HYDROCHLORIDE 10 MILLIGRAM(S): 1 TABLET, FILM COATED ORAL at 18:03

## 2019-09-27 RX ADMIN — AMLODIPINE BESYLATE 5 MILLIGRAM(S): 2.5 TABLET ORAL at 05:25

## 2019-09-27 RX ADMIN — ZIPRASIDONE HYDROCHLORIDE 80 MILLIGRAM(S): 20 CAPSULE ORAL at 18:03

## 2019-09-27 NOTE — PROGRESS NOTE ADULT - SUBJECTIVE AND OBJECTIVE BOX
Patient was seen and examined. Spoke with RN. Chart reviewed.  awake cooperative,    No events overnight.  Vital Signs Last 24 Hrs  T(F): 97.5 (27 Sep 2019 13:32), Max: 98 (27 Sep 2019 05:21)  HR: 77 (27 Sep 2019 13:32) (72 - 82)  BP: 167/78 (27 Sep 2019 13:32) (148/71 - 187/110)  SpO2: --  MEDICATIONS  (STANDING):  amLODIPine   Tablet 5 milliGRAM(s) Oral daily  aspirin enteric coated 81 milliGRAM(s) Oral daily  dextrose 5% + sodium chloride 0.9%. 1000 milliLiter(s) (75 mL/Hr) IV Continuous <Continuous>  fluPHENAZine 10 milliGRAM(s) Oral every 12 hours  heparin  Injectable 5000 Unit(s) SubCutaneous every 8 hours  metoprolol succinate ER 25 milliGRAM(s) Oral daily  ziprasidone 80 milliGRAM(s) Oral two times a day    MEDICATIONS  (PRN):  acetaminophen   Tablet .. 650 milliGRAM(s) Oral every 6 hours PRN Temp greater or equal to 38C (100.4F), Mild Pain (1 - 3)    Labs:                        13.3   4.73  )-----------( 137      ( 27 Sep 2019 07:00 )             42.6                         12.2   4.62  )-----------( 144      ( 26 Sep 2019 06:18 )             39.4     27 Sep 2019 07:00    143    |  101    |  22     ----------------------------<  112    4.3     |  34     |  1.1    26 Sep 2019 06:18    144    |  103    |  22     ----------------------------<  108    4.8     |  34     |  1.0      Ca    9.9        27 Sep 2019 07:00  Ca    9.8        26 Sep 2019 06:18  Phos  4.2       26 Sep 2019 06:18  Mg     2.1       26 Sep 2019 06:18    TPro  7.8    /  Alb  4.5    /  TBili  0.6    /  DBili  x      /  AST  25     /  ALT  32     /  AlkPhos  54     27 Sep 2019 07:00  TPro  7.4    /  Alb  4.2    /  TBili  0.4    /  DBili  x      /  AST  24     /  ALT  32     /  AlkPhos  52     26 Sep 2019 06:18      Urinalysis Basic - ( 25 Sep 2019 17:50 )    Color: Yellow / Appearance: Clear / S.015 / pH: x  Gluc: x / Ketone: Negative  / Bili: Negative / Urobili: 0.2   Blood: x / Protein: Negative / Nitrite: Negative   Leuk Esterase: Negative / RBC: x / WBC x   Sq Epi: x / Non Sq Epi: x / Bacteria: x        Culture - Urine (collected 25 Sep 2019 17:50)  Source: .Urine Catheterized  Final Report (27 Sep 2019 02:59):    No growth        Radiology:    General: comfortable, NAD  Neurology: A&Ox2, nonfocal  Head:  Normocephalic, atraumatic  ENT:  Mucosa moist, no ulcerations  Neck:  Supple, no JVD,   Skin: no breakdown  Resp: CTA B/L  CV: RRR, S1S2,   GI: Soft, NT, bowel sounds  MS: No edema, + peripheral pulses, FROM all 4 extremity

## 2019-09-28 RX ADMIN — AMLODIPINE BESYLATE 5 MILLIGRAM(S): 2.5 TABLET ORAL at 05:30

## 2019-09-28 RX ADMIN — ZIPRASIDONE HYDROCHLORIDE 80 MILLIGRAM(S): 20 CAPSULE ORAL at 17:04

## 2019-09-28 RX ADMIN — ZIPRASIDONE HYDROCHLORIDE 80 MILLIGRAM(S): 20 CAPSULE ORAL at 05:29

## 2019-09-28 RX ADMIN — Medication 25 MILLIGRAM(S): at 05:30

## 2019-09-28 RX ADMIN — Medication 81 MILLIGRAM(S): at 11:30

## 2019-09-28 RX ADMIN — FLUPHENAZINE HYDROCHLORIDE 10 MILLIGRAM(S): 1 TABLET, FILM COATED ORAL at 17:04

## 2019-09-28 RX ADMIN — FLUPHENAZINE HYDROCHLORIDE 10 MILLIGRAM(S): 1 TABLET, FILM COATED ORAL at 05:30

## 2019-09-28 NOTE — PROGRESS NOTE ADULT - SUBJECTIVE AND OBJECTIVE BOX
Patient was seen and examined. Spoke with RN. Chart reviewed.    No events overnight.  Vital Signs Last 24 Hrs  T(F): 97.4 (28 Sep 2019 05:23), Max: 97.8 (27 Sep 2019 22:04)  HR: 72 (28 Sep 2019 05:23) (70 - 77)  BP: 143/77 (28 Sep 2019 05:23) (136/81 - 167/78)  SpO2: --  MEDICATIONS  (STANDING):  amLODIPine   Tablet 5 milliGRAM(s) Oral daily  aspirin enteric coated 81 milliGRAM(s) Oral daily  dextrose 5% + sodium chloride 0.9%. 1000 milliLiter(s) (75 mL/Hr) IV Continuous <Continuous>  fluPHENAZine 10 milliGRAM(s) Oral every 12 hours  heparin  Injectable 5000 Unit(s) SubCutaneous every 8 hours  metoprolol succinate ER 25 milliGRAM(s) Oral daily  ziprasidone 80 milliGRAM(s) Oral two times a day    MEDICATIONS  (PRN):  acetaminophen   Tablet .. 650 milliGRAM(s) Oral every 6 hours PRN Temp greater or equal to 38C (100.4F), Mild Pain (1 - 3)    Labs:                        13.3   4.73  )-----------( 137      ( 27 Sep 2019 07:00 )             42.6     27 Sep 2019 07:00    143    |  101    |  22     ----------------------------<  112    4.3     |  34     |  1.1      Ca    9.9        27 Sep 2019 07:00    TPro  7.8    /  Alb  4.5    /  TBili  0.6    /  DBili  x      /  AST  25     /  ALT  32     /  AlkPhos  54     27 Sep 2019 07:00          Culture - Urine (collected 25 Sep 2019 17:50)  Source: .Urine Catheterized  Final Report (27 Sep 2019 02:59):    No growth        Radiology:    General: comfortable, NAD  Neurology: A&Ox2, nonfocal  Head:  Normocephalic, atraumatic  ENT:  Mucosa moist, no ulcerations  Neck:  Supple, no JVD,   Skin: no breakdowns (as per RN)  Resp: CTA B/L  CV: RRR, S1S2,   GI: Soft, NT, bowel sounds  MS: No edema, + peripheral pulses, FROM all 4 extremity

## 2019-09-29 RX ADMIN — Medication 650 MILLIGRAM(S): at 04:41

## 2019-09-29 RX ADMIN — AMLODIPINE BESYLATE 5 MILLIGRAM(S): 2.5 TABLET ORAL at 06:06

## 2019-09-29 RX ADMIN — FLUPHENAZINE HYDROCHLORIDE 10 MILLIGRAM(S): 1 TABLET, FILM COATED ORAL at 06:06

## 2019-09-29 RX ADMIN — ZIPRASIDONE HYDROCHLORIDE 80 MILLIGRAM(S): 20 CAPSULE ORAL at 06:06

## 2019-09-29 RX ADMIN — ZIPRASIDONE HYDROCHLORIDE 80 MILLIGRAM(S): 20 CAPSULE ORAL at 18:24

## 2019-09-29 RX ADMIN — Medication 81 MILLIGRAM(S): at 10:41

## 2019-09-29 RX ADMIN — Medication 650 MILLIGRAM(S): at 04:45

## 2019-09-29 RX ADMIN — Medication 25 MILLIGRAM(S): at 06:06

## 2019-09-29 RX ADMIN — FLUPHENAZINE HYDROCHLORIDE 10 MILLIGRAM(S): 1 TABLET, FILM COATED ORAL at 18:24
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
## 2019-09-29 NOTE — PROGRESS NOTE ADULT - SUBJECTIVE AND OBJECTIVE BOX
SUBJECTIVE:    Patient is a 69y old Male who presents with a chief complaint of sent from [facility] for AMS (28 Sep 2019 12:13)    Currently admitted to medicine with the primary diagnosis of Delirium     Today is hospital day 4d. This morning he is resting comfortably in bed and reports no new issues or overnight events.     PAST MEDICAL & SURGICAL HISTORY  Hypertension  Neuropathy  Substance abuse  Schizophrenia  COPD (chronic obstructive pulmonary disease)  GERD (gastroesophageal reflux disease)  No significant past surgical history    SOCIAL HISTORY:  Negative for smoking/alcohol/drug use.     ALLERGIES:  Talwin (Unknown)    MEDICATIONS:  STANDING MEDICATIONS  amLODIPine   Tablet 5 milliGRAM(s) Oral daily  aspirin enteric coated 81 milliGRAM(s) Oral daily  dextrose 5% + sodium chloride 0.9%. 1000 milliLiter(s) IV Continuous <Continuous>  fluPHENAZine 10 milliGRAM(s) Oral every 12 hours  heparin  Injectable 5000 Unit(s) SubCutaneous every 8 hours  metoprolol succinate ER 25 milliGRAM(s) Oral daily  ziprasidone 80 milliGRAM(s) Oral two times a day    PRN MEDICATIONS  acetaminophen   Tablet .. 650 milliGRAM(s) Oral every 6 hours PRN    VITALS:   T(F): 98.3  HR: 61  BP: 157/89  RR: 16  SpO2: --    LABS:                        RADIOLOGY:    PHYSICAL EXAM:  GEN: No acute distress  LUNGS: Clear to auscultation bilaterally   HEART: Regular  ABD: Soft, non-tender, non-distended.  EXT: NC/NC/NE/2+PP/ALEJANDRO/Skin Intact.   NEURO: AAOX3    Intravenous access:   NG tube:   Finch Catheter:

## 2019-09-30 VITALS
DIASTOLIC BLOOD PRESSURE: 87 MMHG | TEMPERATURE: 98 F | SYSTOLIC BLOOD PRESSURE: 156 MMHG | HEART RATE: 72 BPM | RESPIRATION RATE: 16 BRPM

## 2019-09-30 RX ORDER — METOPROLOL TARTRATE 50 MG
1 TABLET ORAL
Qty: 30 | Refills: 0
Start: 2019-09-30

## 2019-09-30 RX ORDER — FLUPHENAZINE HYDROCHLORIDE 1 MG/1
25 TABLET, FILM COATED ORAL
Qty: 0 | Refills: 0 | DISCHARGE

## 2019-09-30 RX ORDER — METOPROLOL TARTRATE 50 MG
1 TABLET ORAL
Qty: 0 | Refills: 0 | DISCHARGE

## 2019-09-30 RX ORDER — ACETAMINOPHEN 500 MG
2 TABLET ORAL
Qty: 0 | Refills: 0 | DISCHARGE

## 2019-09-30 RX ORDER — DOCUSATE SODIUM 100 MG
2 CAPSULE ORAL
Qty: 0 | Refills: 0 | DISCHARGE

## 2019-09-30 RX ORDER — DIPHENHYDRAMINE HCL 50 MG
1 CAPSULE ORAL
Qty: 0 | Refills: 0 | DISCHARGE

## 2019-09-30 RX ORDER — ASPIRIN/CALCIUM CARB/MAGNESIUM 324 MG
1 TABLET ORAL
Qty: 0 | Refills: 0 | DISCHARGE

## 2019-09-30 RX ORDER — LORATADINE 10 MG/1
1 TABLET ORAL
Qty: 0 | Refills: 0 | DISCHARGE

## 2019-09-30 RX ORDER — FLUPHENAZINE HYDROCHLORIDE 1 MG/1
1 TABLET, FILM COATED ORAL
Qty: 0 | Refills: 0 | DISCHARGE

## 2019-09-30 RX ORDER — CHOLECALCIFEROL (VITAMIN D3) 125 MCG
0 CAPSULE ORAL
Qty: 0 | Refills: 0 | DISCHARGE

## 2019-09-30 RX ORDER — ERGOCALCIFEROL 1.25 MG/1
1 CAPSULE ORAL
Qty: 0 | Refills: 0 | DISCHARGE

## 2019-09-30 RX ORDER — FLUPHENAZINE HYDROCHLORIDE 1 MG/1
1 TABLET, FILM COATED ORAL
Qty: 30 | Refills: 0
Start: 2019-09-30

## 2019-09-30 RX ADMIN — Medication 650 MILLIGRAM(S): at 13:19

## 2019-09-30 RX ADMIN — FLUPHENAZINE HYDROCHLORIDE 10 MILLIGRAM(S): 1 TABLET, FILM COATED ORAL at 05:50

## 2019-09-30 RX ADMIN — Medication 650 MILLIGRAM(S): at 10:58

## 2019-09-30 RX ADMIN — Medication 81 MILLIGRAM(S): at 10:51

## 2019-09-30 RX ADMIN — Medication 25 MILLIGRAM(S): at 05:50

## 2019-09-30 RX ADMIN — ZIPRASIDONE HYDROCHLORIDE 80 MILLIGRAM(S): 20 CAPSULE ORAL at 05:50

## 2019-09-30 RX ADMIN — AMLODIPINE BESYLATE 5 MILLIGRAM(S): 2.5 TABLET ORAL at 05:50

## 2019-09-30 NOTE — DISCHARGE NOTE PROVIDER - CARE PROVIDER_API CALL
James Kaiser)  Internal Medicine  [address] [place] [address]  Phone: [phone]  Fax: [phone]  Follow Up Time:
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
## 2019-09-30 NOTE — DISCHARGE NOTE NURSING/CASE MANAGEMENT/SOCIAL WORK - PATIENT PORTAL LINK FT
You can access the FollowMyHealth Patient Portal offered by [facility] by registering at the following website: [email or web address]. By joining [employer]’s FollowMyHealth portal, you will also be able to view your health information using other applications (apps) compatible with our system.

## 2019-09-30 NOTE — DISCHARGE NOTE PROVIDER - NSDCCPCAREPLAN_GEN_ALL_CORE_FT
PRINCIPAL DISCHARGE DIAGNOSIS  Diagnosis: Delirium  Assessment and Plan of Treatment:       SECONDARY DISCHARGE DIAGNOSES  Diagnosis: Elevated troponin  Assessment and Plan of Treatment:

## 2019-09-30 NOTE — PROGRESS NOTE ADULT - SUBJECTIVE AND OBJECTIVE BOX
SUBJECTIVE:    Patient is a 69y old Male who presents with a chief complaint of sent from [facility] for AMS (30 Sep 2019 10:24)    Currently admitted to medicine with the primary diagnosis of Delirium     Today is hospital day 5d. This morning he is resting comfortably in bed and reports no new issues or overnight events.     PAST MEDICAL & SURGICAL HISTORY  Hypertension  Neuropathy  Substance abuse  Schizophrenia  COPD (chronic obstructive pulmonary disease)  GERD (gastroesophageal reflux disease)  No significant past surgical history    SOCIAL HISTORY:  Negative for smoking/alcohol/drug use.     ALLERGIES:  Talwin (Unknown)    MEDICATIONS:  STANDING MEDICATIONS  amLODIPine   Tablet 5 milliGRAM(s) Oral daily  aspirin enteric coated 81 milliGRAM(s) Oral daily  dextrose 5% + sodium chloride 0.9%. 1000 milliLiter(s) IV Continuous <Continuous>  fluPHENAZine 10 milliGRAM(s) Oral every 12 hours  heparin  Injectable 5000 Unit(s) SubCutaneous every 8 hours  metoprolol succinate ER 25 milliGRAM(s) Oral daily  ziprasidone 80 milliGRAM(s) Oral two times a day    PRN MEDICATIONS  acetaminophen   Tablet .. 650 milliGRAM(s) Oral every 6 hours PRN    VITALS:   T(F): 97.4  HR: 75  BP: 159/88  RR: 16  SpO2: --    LABS:                        RADIOLOGY:    PHYSICAL EXAM:  GEN: No acute distress  LUNGS: Clear to auscultation bilaterally   HEART: Regular  ABD: Soft, non-tender, non-distended.  EXT: NC/NC/NE/2+PP/ALEJANDRO/Skin Intact.   NEURO: AAOX3    Intravenous access:   NG tube:   Finch Catheter:

## 2019-10-03 DIAGNOSIS — R79.89 OTHER SPECIFIED ABNORMAL FINDINGS OF BLOOD CHEMISTRY: ICD-10-CM

## 2019-10-03 DIAGNOSIS — I10 ESSENTIAL (PRIMARY) HYPERTENSION: ICD-10-CM

## 2019-10-03 DIAGNOSIS — R41.82 ALTERED MENTAL STATUS, UNSPECIFIED: ICD-10-CM

## 2019-10-03 DIAGNOSIS — R45.1 RESTLESSNESS AND AGITATION: ICD-10-CM

## 2019-10-03 DIAGNOSIS — F20.9 SCHIZOPHRENIA, UNSPECIFIED: ICD-10-CM

## 2019-10-03 DIAGNOSIS — I21.4 NON-ST ELEVATION (NSTEMI) MYOCARDIAL INFARCTION: ICD-10-CM

## 2019-10-03 DIAGNOSIS — J44.9 CHRONIC OBSTRUCTIVE PULMONARY DISEASE, UNSPECIFIED: ICD-10-CM

## 2019-10-03 DIAGNOSIS — G62.9 POLYNEUROPATHY, UNSPECIFIED: ICD-10-CM

## 2019-10-03 DIAGNOSIS — F05 DELIRIUM DUE TO KNOWN PHYSIOLOGICAL CONDITION: ICD-10-CM

## 2019-10-03 DIAGNOSIS — Z88.8 ALLERGY STATUS TO OTHER DRUGS, MEDICAMENTS AND BIOLOGICAL SUBSTANCES STATUS: ICD-10-CM

## 2019-10-03 DIAGNOSIS — K21.9 GASTRO-ESOPHAGEAL REFLUX DISEASE WITHOUT ESOPHAGITIS: ICD-10-CM

## 2019-10-03 DIAGNOSIS — F19.10 OTHER PSYCHOACTIVE SUBSTANCE ABUSE, UNCOMPLICATED: ICD-10-CM

## 2019-10-06 ENCOUNTER — EMERGENCY (EMERGENCY)
Facility: HOSPITAL | Age: 69
LOS: 0 days | Discharge: NURSING, REHAB OR LONG-TERM CARE | End: 2019-10-06
Attending: EMERGENCY MEDICINE | Admitting: EMERGENCY MEDICINE
Payer: MEDICARE

## 2019-10-06 VITALS
WEIGHT: 265 LBS | HEIGHT: 67 IN | RESPIRATION RATE: 20 BRPM | DIASTOLIC BLOOD PRESSURE: 90 MMHG | HEART RATE: 88 BPM | TEMPERATURE: 98 F | SYSTOLIC BLOOD PRESSURE: 165 MMHG | OXYGEN SATURATION: 100 %

## 2019-10-06 DIAGNOSIS — F20.9 SCHIZOPHRENIA, UNSPECIFIED: ICD-10-CM

## 2019-10-06 DIAGNOSIS — Z88.8 ALLERGY STATUS TO OTHER DRUGS, MEDICAMENTS AND BIOLOGICAL SUBSTANCES STATUS: ICD-10-CM

## 2019-10-06 DIAGNOSIS — F22 DELUSIONAL DISORDERS: ICD-10-CM

## 2019-10-06 DIAGNOSIS — R45.1 RESTLESSNESS AND AGITATION: ICD-10-CM

## 2019-10-06 PROCEDURE — 99283 EMERGENCY DEPT VISIT LOW MDM: CPT

## 2019-10-06 RX ADMIN — Medication 2 MILLIGRAM(S): at 03:52
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
## 2019-10-06 NOTE — ED PROVIDER NOTE - PHYSICAL EXAMINATION
CONSTITUTIONAL: Well-appearing; well-nourished; in no apparent distress.   EYES: PERRL; EOM intact.   CARDIOVASCULAR: Normal S1, S2; no murmurs, rubs, or gallops.   RESPIRATORY: Normal chest excursion with respiration; breath sounds clear and equal bilaterally; no wheezes, rhonchi, or rales.  GI/: Normal bowel sounds; non-distended; non-tender; no palpable organomegaly.   SKIN: Normal for age and race; warm; dry; good turgor; no apparent lesions or exudate.   NEURO/PSYCH: A & O x 4; grossly unremarkable. + agitated but able to redirect. poor insight and have delusional behavior. denies SI/HI and A/V hallucination.

## 2019-10-06 NOTE — ED ADULT TRIAGE NOTE - WEIGHT IN LBS
Please call patient or his wife:    The bone density shows osteoporosis. Please set up an appointment with an endocrinologist - [place] [phone].    Noe Andrew M.D.     [phone]

## 2019-10-06 NOTE — ED ADULT NURSE NOTE - NSIMPLEMENTINTERV_GEN_ALL_ED
Implemented All Universal Safety Interventions:  [place] to call system. Call bell, personal items and telephone within reach. Instruct patient to call for assistance. Room bathroom lighting operational. Non-slip footwear when patient is off stretcher. Physically safe environment: no spills, clutter or unnecessary equipment. Stretcher in lowest position, wheels locked, appropriate side rails in place.

## 2019-10-06 NOTE — ED PROVIDER NOTE - PATIENT PORTAL LINK FT
You can access the FollowMyHealth Patient Portal offered by [facility] by registering at the following website: [email or web address]. By joining [employer]’s FollowMyHealth portal, you will also be able to view your health information using other applications (apps) compatible with our system.
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
## 2019-10-06 NOTE — ED PROVIDER NOTE - NSFOLLOWUPINSTRUCTIONS_ED_ALL_ED_FT
Schizophrenia    Agitation was treated with Ativan in ED. Please follow up with patient's primary psychiatrist at the facility for any medication adjustment.    WHAT YOU NEED TO KNOW:    Schizophrenia is a long-term mental disease that affects how your brain works. It is a disease that may change how you think, feel, and behave. You may not be able to know what is real and what is not real. Your thoughts may not be clear, or may jump from one topic to another.    DISCHARGE INSTRUCTIONS:    Medicines:   Antipsychotics: These help decrease psychotic symptoms and severe agitation. You may need antiparkinson medicine to control muscle stiffness, twitches, and restlessness caused by antipsychotic medicines.  Antianxiety medicine: This medicine may be given to decrease anxiety and help you feel calm and relaxed.   Antidepressants: These help with symptoms of depression and anxiety.  Mood stabilizers: These control mood swings.  Tranquilizers: These increase feelings of being calm and relaxed.    Take your medicine as directed. Contact your healthcare provider if you think your medicine is not helping or if you have side effects. Tell him of her if you are allergic to any medicine. Keep a list of the medicines, vitamins, and herbs you take. Include the amounts, and when and why you take them. Bring the list or the pill bottles to follow-up visits. Carry your medicine list with you in case of an emergency.    Follow up with your healthcare provider or psychiatrist as directed: Write down your questions so you remember to ask them during your visits.    Manage your symptoms:   Do not stop taking your medicines: Tell your healthcare provider or psychiatrist if you have any problems with or questions about your medicines.    Do not stop your therapies: It is normal to have doubts about or to feel discomfort with your therapy. Tell your healthcare provider or psychiatrist if you are not comfortable or have questions about your therapies.    Get regular sleep: Try to get 6 to 8 hours of sleep each night. Tell your healthcare provider or psychiatrist if you are not able to sleep, or if you are sleeping too much.  Do not drink alcohol: Alcohol interacts with medicine used to treat schizophrenia.    Contact your healthcare provider or psychiatrist if:   You feel that you are having symptoms of schizophrenia.  You are not able to sleep well, or are sleeping more than usual.  You cannot eat or are eating more than usual.  You have questions about your condition or care.    Return to the emergency department if:   You think about killing yourself or someone else.  You have a rash, swelling, or trouble breathing after you take your medicine.

## 2019-10-06 NOTE — ED PROVIDER NOTE - NS ED ROS FT
Constitutional: no fever, chills, no recent weight loss, change in appetite or malaise  Cardiac: No chest pain, SOB or edema.  Respiratory: No cough or respiratory distress  GI: No nausea, vomiting, diarrhea or abdominal pain.  MS: no pain to back or extremities, no loss of ROM, no weakness  Neuro: No headache or weakness. No LOC.  Psych: see HPI  Skin: No skin rash.  Endocrine: No history of thyroid disease or diabetes.

## 2019-10-06 NOTE — ED PROVIDER NOTE - PROGRESS NOTE DETAILS
2mg ativan was used to treat agitation. patient is easily able to re-direct. review previous psych consult, patient is chronic paranoid schizophrenia. patient denies thought of self harm and denies HI. denies AV hallucination. no immediate need for psych evaluation

## 2020-03-16 NOTE — PATIENT PROFILE ADULT - NSPROIMPLANTSMEDDEV_GEN_A_NUR
POSTOPERATIVE VISIT    DATE OF VISIT:  3/16/2020     Subjective:  Basilio Neal is a 41 year old male who is status post robotic assisted laparoscopic repair incarcerated ventral hernia with mesh completed on 02/06/2020.  He states that he is doing well, and denies any pain. The patient states he took narcotic pain medication for 30 days, and then switched over to OTC pain medication for 40 days. Patient is tolerating a regular diet without nausea and vomiting.  Bowel movements are regular without diarrhea or constipation.  He denies any problems with his incisions such as redness or drainage. Patient is getting back into regular activities. He has returned to work as of last week.     Last week was seen by Dr. Larson for question of incisional redness.  He notes the port site in left lower quadrant had been reddened concerning but is improving.  Denies drainage from an incision      Past medical history, surgical history, medications, and allergies reviewed and are unchanged.    Objective:  Blood pressure (!) 157/106, pulse 86, temperature 96.1 °F (35.6 °C), temperature source Temporal, height 5' 5\" (1.651 m), weight 119.3 kg.  Body mass index is 43.77 kg/m².  General:  Healthy appearing in no apparent distress. Well nourished, well developed, morbidly obese male.    Abdomen:  Soft, nontender, nondistended, obese. No hepatomegaly or splenomegaly.  No palpable masses.  Midline incision were redundant skin was removed is intact and dry.  There is no erythema.  No tenderness.  Left-sided port incisions were also dry and intact without erythema.  Umbilical skin is all viable and healthy.  Has a deep umbilicus at this point it does warrant close monitoring  Skin:  Incisions are clean, dry, and intact without surrounding cellulitis or drainage.      Assessment:  Basilio Neal is status post robotic assisted laparoscopic repair incarcerated ventral hernia with mesh completed on 02/06/2020.  He is doing well postoperatively  without complaints.  Incisions are healing well without signs of infection.  No hernia recurrence.    Plan:  Patient was instructed to use a Q-tip with peroxide to keep clean umbilicus. Patient can advance activity slowly as tolerated using pain as a guide although no abdominal exercise until 3 months post operative.  May use ibuprofen as needed.  Patient may apply moisturizing lotion or vitamin E oil to incision sites once skin glue has completely come off.  He should follow up with our office for final check in two month.    The patient states his understanding and is in agreement with this plan.  All of his questions have been answered to his satisfaction.    On 3/16/2020, [employer] scribed the services personally performed by Ronan Dela Cruz MD.     The documentation recorded by the scribe accurately and completely reflects the service(s) I personally performed and the decisions made by me.     Ronan Dela Cruz MD              unable to assess, disorganized, illogical speech

## 2020-07-16 NOTE — ED ADULT TRIAGE NOTE - MODE OF ARRIVAL
PCA/State Vehicle
Other (Free Text): Patient reports itching on the cyst area, discuss options such as surgical removal with Dr. Bruno , patient preferred to leave it alone for now , if bothersome may schedule surgical removal.
Note Text (......Xxx Chief Complaint.): This diagnosis correlates with the
Detail Level: Zone
Other (Free Text): Patient reports itching comes and goes only on the spot of the cyst area, offered excision with  Dr. Bruno . Patient declines scheduling for surgical removal as of now. May call the office of chose to have it surgical removed.

## 2021-12-23 NOTE — ED PROVIDER NOTE - DISCHARGE DATE
PROCEDURE: US Scrotum.



TECHNIQUE: Multiple real-time grayscale images were obtained over

the scrotum in various projections bilaterally.



INDICATION: Testicular pain.



FINDINGS: The right testicle measures 4.3 x 2.7 x 3.0 cm. Left

testicle measures 3.8 x 2.4 x 2.9 cm. Both testicles demonstrate

normal homogeneous echogenicity and blood flow. There are small

bilateral hydroceles. There is a 1 cm epididymal cyst on the

right. There is questionable left varicocele. There are no

discrete testicular masses.



IMPRESSION:

1. 1 cm right epididymal cyst.

2. Questionable left varicocele.

3. Small bilateral hydroceles.

4. No evidence for torsion or discrete testicular mass. 



Dictated by: 



  Dictated on workstation # [id number] 28-Nov-2018

## 2025-06-09 NOTE — PATIENT PROFILE ADULT - FUNCTIONAL SCREEN CURRENT LEVEL: DRESSING, MLM
Call to patient. Patient denies any complaints related to anticoagulation therapy at this time. Patient reports no change in medication, diet or health. Reinforced with patient to call clinic with any medication changes as this can impact INR. Reinforced signs and symptoms bleeding/clotting with patient. Patient aware to seek medical care if signs and symptoms develop. Advised that if patient falls and/or hits their head, they should seek medical attention. Verbalizes understanding.     Dosing instructions given to patient verbally over the phone. Advised to call the clinic with any questions or concerns. Patient verbalizes understanding. Has clinic number.    Anticoagulation Summary  As of 2025      INR goal:  1.5-2.5   TTR:  63.8% (8.9 y)   INR used for dosin.2 (2025)   Warfarin maintenance plan:  3 mg (2 mg x 1.5) every M, W, F; 5 mg (2 mg x 2.5) all other days   Weekly warfarin total:  29 mg   Plan last modified:  Naomi Kwon RN (2025)   Next INR check:  2025   Priority:  Follow-Up - 2 Weeks   Target end date:  Indefinite    Indications    Encounter for therapeutic drug monitoring [Z51.81]  Atrial fibrillation  paroxysmal [I48.0]  Warfarin anticoagulation [Z79.01]  Atrial flutter  paroxysmal [I48.91]                 Anticoagulation Episode Summary       INR check location:  Outside Lab    Preferred lab:  --    Send INR reminders to:  [facility] (OPEN ENROLLMENT) ACS CARD/EP    Comments:  *STAC due 9/3/25 **INR GOAL: 1.8-2.2; cc'd lab 3/25/25; Home Meter; 2 mg Tabs; OV 25          Anticoagulation Care Providers       Provider Role Specialty Phone number    New Murdock MD Responsible Internal Medicine - Clinical Cardiac Electrophysiology [phone]            Supervising provider: Jayesh Meza MD       0 = independent